# Patient Record
Sex: FEMALE | Race: WHITE | NOT HISPANIC OR LATINO | URBAN - METROPOLITAN AREA
[De-identification: names, ages, dates, MRNs, and addresses within clinical notes are randomized per-mention and may not be internally consistent; named-entity substitution may affect disease eponyms.]

---

## 2017-09-01 ENCOUNTER — OUTPATIENT (OUTPATIENT)
Dept: OUTPATIENT SERVICES | Facility: HOSPITAL | Age: 47
LOS: 1 days | End: 2017-09-01
Payer: MEDICAID

## 2017-09-05 DIAGNOSIS — R69 ILLNESS, UNSPECIFIED: ICD-10-CM

## 2017-10-01 PROCEDURE — G9001: CPT

## 2017-10-08 ENCOUNTER — EMERGENCY (EMERGENCY)
Facility: HOSPITAL | Age: 47
LOS: 1 days | Discharge: HOME | End: 2017-10-08
Attending: EMERGENCY MEDICINE | Admitting: EMERGENCY MEDICINE
Payer: MEDICAID

## 2017-10-08 VITALS
RESPIRATION RATE: 18 BRPM | HEART RATE: 103 BPM | SYSTOLIC BLOOD PRESSURE: 128 MMHG | OXYGEN SATURATION: 99 % | DIASTOLIC BLOOD PRESSURE: 65 MMHG

## 2017-10-08 VITALS
TEMPERATURE: 99 F | DIASTOLIC BLOOD PRESSURE: 81 MMHG | RESPIRATION RATE: 14 BRPM | OXYGEN SATURATION: 100 % | SYSTOLIC BLOOD PRESSURE: 122 MMHG | HEART RATE: 107 BPM

## 2017-10-08 LAB
ALBUMIN SERPL ELPH-MCNC: 3.5 G/DL — SIGNIFICANT CHANGE UP (ref 3.3–5)
ALP SERPL-CCNC: 66 U/L — SIGNIFICANT CHANGE UP (ref 40–120)
ALT FLD-CCNC: 14 U/L — SIGNIFICANT CHANGE UP (ref 4–33)
APAP SERPL-MCNC: < 15 UG/ML — LOW (ref 15–25)
AST SERPL-CCNC: 33 U/L — HIGH (ref 4–32)
BARBITURATES MEASUREMENT: NEGATIVE — SIGNIFICANT CHANGE UP
BASOPHILS # BLD AUTO: 0.07 K/UL — SIGNIFICANT CHANGE UP (ref 0–0.2)
BASOPHILS NFR BLD AUTO: 0.9 % — SIGNIFICANT CHANGE UP (ref 0–2)
BENZODIAZ SERPL-MCNC: NEGATIVE — SIGNIFICANT CHANGE UP
BILIRUB SERPL-MCNC: 0.4 MG/DL — SIGNIFICANT CHANGE UP (ref 0.2–1.2)
BUN SERPL-MCNC: 11 MG/DL — SIGNIFICANT CHANGE UP (ref 7–23)
CALCIUM SERPL-MCNC: 8.6 MG/DL — SIGNIFICANT CHANGE UP (ref 8.4–10.5)
CHLORIDE SERPL-SCNC: 103 MMOL/L — SIGNIFICANT CHANGE UP (ref 98–107)
CO2 SERPL-SCNC: 20 MMOL/L — LOW (ref 22–31)
CREAT SERPL-MCNC: 0.85 MG/DL — SIGNIFICANT CHANGE UP (ref 0.5–1.3)
EOSINOPHIL # BLD AUTO: 0.08 K/UL — SIGNIFICANT CHANGE UP (ref 0–0.5)
EOSINOPHIL NFR BLD AUTO: 1.1 % — SIGNIFICANT CHANGE UP (ref 0–6)
ETHANOL BLD-MCNC: < 10 MG/DL — SIGNIFICANT CHANGE UP
GLUCOSE SERPL-MCNC: 131 MG/DL — HIGH (ref 70–99)
HCT VFR BLD CALC: 30.9 % — LOW (ref 34.5–45)
HGB BLD-MCNC: 8.7 G/DL — LOW (ref 11.5–15.5)
IMM GRANULOCYTES # BLD AUTO: 0.05 # — SIGNIFICANT CHANGE UP
IMM GRANULOCYTES NFR BLD AUTO: 0.7 % — SIGNIFICANT CHANGE UP (ref 0–1.5)
LYMPHOCYTES # BLD AUTO: 1.75 K/UL — SIGNIFICANT CHANGE UP (ref 1–3.3)
LYMPHOCYTES # BLD AUTO: 23.5 % — SIGNIFICANT CHANGE UP (ref 13–44)
MCHC RBC-ENTMCNC: 19.1 PG — LOW (ref 27–34)
MCHC RBC-ENTMCNC: 28.2 % — LOW (ref 32–36)
MCV RBC AUTO: 67.9 FL — LOW (ref 80–100)
MONOCYTES # BLD AUTO: 0.65 K/UL — SIGNIFICANT CHANGE UP (ref 0–0.9)
MONOCYTES NFR BLD AUTO: 8.7 % — SIGNIFICANT CHANGE UP (ref 2–14)
NEUTROPHILS # BLD AUTO: 4.86 K/UL — SIGNIFICANT CHANGE UP (ref 1.8–7.4)
NEUTROPHILS NFR BLD AUTO: 65.1 % — SIGNIFICANT CHANGE UP (ref 43–77)
NRBC # FLD: 0 — SIGNIFICANT CHANGE UP
PLATELET # BLD AUTO: 325 K/UL — SIGNIFICANT CHANGE UP (ref 150–400)
PMV BLD: 9.9 FL — SIGNIFICANT CHANGE UP (ref 7–13)
POTASSIUM SERPL-MCNC: 4 MMOL/L — SIGNIFICANT CHANGE UP (ref 3.5–5.3)
POTASSIUM SERPL-SCNC: 4 MMOL/L — SIGNIFICANT CHANGE UP (ref 3.5–5.3)
PROT SERPL-MCNC: 6.8 G/DL — SIGNIFICANT CHANGE UP (ref 6–8.3)
RBC # BLD: 4.55 M/UL — SIGNIFICANT CHANGE UP (ref 3.8–5.2)
RBC # FLD: 19.7 % — HIGH (ref 10.3–14.5)
SALICYLATES SERPL-MCNC: < 5 MG/DL — LOW (ref 15–30)
SODIUM SERPL-SCNC: 137 MMOL/L — SIGNIFICANT CHANGE UP (ref 135–145)
WBC # BLD: 7.46 K/UL — SIGNIFICANT CHANGE UP (ref 3.8–10.5)
WBC # FLD AUTO: 7.46 K/UL — SIGNIFICANT CHANGE UP (ref 3.8–10.5)

## 2017-10-08 PROCEDURE — 93010 ELECTROCARDIOGRAM REPORT: CPT

## 2017-10-08 PROCEDURE — 71010: CPT | Mod: 26

## 2017-10-08 PROCEDURE — 99285 EMERGENCY DEPT VISIT HI MDM: CPT | Mod: 25

## 2017-10-08 RX ORDER — NALOXONE HYDROCHLORIDE 4 MG/.1ML
0.04 SPRAY NASAL ONCE
Qty: 0 | Refills: 0 | Status: COMPLETED | OUTPATIENT
Start: 2017-10-08 | End: 2017-10-08

## 2017-10-08 RX ORDER — ONDANSETRON 8 MG/1
4 TABLET, FILM COATED ORAL ONCE
Qty: 0 | Refills: 0 | Status: DISCONTINUED | OUTPATIENT
Start: 2017-10-08 | End: 2017-10-12

## 2017-10-08 RX ORDER — SODIUM CHLORIDE 9 MG/ML
1000 INJECTION INTRAMUSCULAR; INTRAVENOUS; SUBCUTANEOUS ONCE
Qty: 0 | Refills: 0 | Status: COMPLETED | OUTPATIENT
Start: 2017-10-08 | End: 2017-10-08

## 2017-10-08 RX ADMIN — NALOXONE HYDROCHLORIDE 0.04 MILLIGRAM(S): 4 SPRAY NASAL at 14:20

## 2017-10-08 RX ADMIN — SODIUM CHLORIDE 2000 MILLILITER(S): 9 INJECTION INTRAMUSCULAR; INTRAVENOUS; SUBCUTANEOUS at 14:24

## 2017-10-08 NOTE — ED ADULT NURSE NOTE - OBJECTIVE STATEMENT
p/t is a 47 y old female received awake and responsive @ present, p/t found lethargic @ home s/p some heroin use, cardiac monitor is on vss no further c/o noted will continue to monitor

## 2017-10-08 NOTE — ED PROVIDER NOTE - ATTENDING CONTRIBUTION TO CARE
I, Jennifer Cabot, MD, have performed a history and physical exam of the patient and discussed their management with the resident. I reviewed the resident's note and agree with the documented findings and plan of care. My medical decision making and observations are found above.    Cabot: 47F with PMH of intravenous heroin abuse, who was BIBEMS after ingesting 10 bags of heroin over 24 hours.  Her friends called EMS this morning when she was unresponsive.  Received 2mg narcan with good effect.  Denies other ingestions.  No F/C/N/V/D/urinary sx/CP/SOB.  Noted to be anemic in the ED.  Denies blood per rectum and hematuria and refuses guaiac. Denies SI/HI. On exam, HDS, AAOx3, no signs of trauma but + track marks, CTAB, heart sounds normal, abd benign, LEs without edema.  Tox screen negative, EKG unconcerning for ischemic or toxidrome.  Safe to go home.  Will emphasize follow up with a PMD re: anemia.

## 2017-10-08 NOTE — ED PROVIDER NOTE - MEDICAL DECISION MAKING DETAILS
47F s/p heroin overdose. 47F s/p heroin overdose.    Cabot: 47F with PMH of intravenous heroin abuse, who was BIBEMS after ingesting 10 bags of heroin over 24 hours.  Her friends called EMS this morning when she was unresponsive.  Received 2mg narcan with good effect.  Denies other ingestions.  No F/C/N/V/D/urinary sx/CP/SOB.  Noted to be anemic in the ED.  Denies blood per rectum and hematuria and refuses guaiac. Denies SI/HI. On exam, HDS, AAOx3, no signs of trauma but + track marks, CTAB, heart sounds normal, abd benign, LEs without edema.  Tox screen negative, EKG unconcerning for ischemic or toxidrome.  Safe to go home.  Will emphasize follow up with a PMD re: anemia. 47F s/p heroin overdose with narcan; will assess basic labs, observe, give additional narcan and reassess.  No evidence of trauma. Once ambulatory with steady gait and back to baseline, can dc home.     Cabot: 47F with PMH of intravenous heroin abuse, who was BIBEMS after ingesting 10 bags of heroin over 24 hours.  Her friends called EMS this morning when she was unresponsive.  Received 2mg narcan with good effect.  Denies other ingestions.  No F/C/N/V/D/urinary sx/CP/SOB.  Noted to be anemic in the ED.  Denies blood per rectum and hematuria and refuses guaiac. Denies SI/HI. On exam, HDS, AAOx3, no signs of trauma but + track marks, CTAB, heart sounds normal, abd benign, LEs without edema.  Tox screen negative, EKG unconcerning for ischemic or toxidrome.  Safe to go home.  Will emphasize follow up with a PMD re: anemia.

## 2017-10-08 NOTE — ED PROVIDER NOTE - PROGRESS NOTE DETAILS
Pt alert and oriented to person, place, time, situation, ambulatory, tolerating PO, no complaints. Wants to go home. Discussed anemia with patient who states has hx of anemia, denies dark stools and is refusing rectal exam for occult. Will dc home. Patient AAox3 without further narcan in the ED; ambulates, eats, denies SI/HI again.  Also reports history of anemia.

## 2017-10-08 NOTE — ED ADULT TRIAGE NOTE - CHIEF COMPLAINT QUOTE
pt arrives lethargic oriented x 3  responds appropriate to verbal stimuli given 2 mg nasal narcan as per EMS pt was more responsive after narcan

## 2017-10-08 NOTE — ED ADULT NURSE REASSESSMENT NOTE - NS ED NURSE REASSESS COMMENT FT1
pt awake and alert. able to ambulate without difficulty. ready for dc as per md. pt received at change of shift. has no iv access. awake and alert. able to ambulate without difficulty. ready for dc as per md.

## 2017-10-08 NOTE — ED PROVIDER NOTE - OBJECTIVE STATEMENT
47F PMH intravenous heroin abuse, formerly used crack cocaine but no longer, presenting after heroin overdose. pt states her   of heroin overdose 5 months ago. Pt had not used any heroin in 3.5 years but in the past 24 hours relapsed, using approx 12 bags IV over 24 hours, including several bags that she states "looked funny like crushed up pills" but used them anyway. Currently denies any complaints or inciting factors that caused her to relapse. Pt given 2 of narcan by EMS with improvement in mental status; was unresponsive upon their arrival, currently alert and oriented. States she feels like she is in withdrawal. 47F PMH intravenous heroin abuse, formerly used crack cocaine but no longer, presenting after heroin overdose. pt states her   of heroin overdose 5 months ago. Pt had not used any heroin in 3.5 years but in the past 24 hours relapsed, using approx 12 bags IV over 24 hours, including several bags that she states "looked funny like crushed up pills" but used them anyway. Currently denies any complaints or inciting factors that caused her to relapse. Pt given 2 of narcan by EMS with improvement in mental status; was unresponsive upon their arrival, currently alert and oriented. States she feels like she is in withdrawal. Pt denies any suicidal thoughts, denies overdose was a suicide attempt.

## 2017-10-26 ENCOUNTER — OUTPATIENT (OUTPATIENT)
Dept: OUTPATIENT SERVICES | Facility: HOSPITAL | Age: 47
LOS: 1 days | Discharge: HOME | End: 2017-10-26

## 2017-10-26 DIAGNOSIS — F11.99 OPIOID USE, UNSPECIFIED WITH UNSPECIFIED OPIOID-INDUCED DISORDER: ICD-10-CM

## 2017-10-30 DIAGNOSIS — F11.20 OPIOID DEPENDENCE, UNCOMPLICATED: ICD-10-CM

## 2017-10-30 LAB
ALBUMIN SERPL ELPH-MCNC: 3.9 G/DL — SIGNIFICANT CHANGE UP (ref 3.3–5)
ALP SERPL-CCNC: 75 U/L — SIGNIFICANT CHANGE UP (ref 40–120)
ALT FLD-CCNC: 8 U/L — SIGNIFICANT CHANGE UP (ref 4–33)
APPEARANCE UR: SIGNIFICANT CHANGE UP
AST SERPL-CCNC: 22 U/L — SIGNIFICANT CHANGE UP (ref 4–32)
BILIRUB SERPL-MCNC: 0.4 MG/DL — SIGNIFICANT CHANGE UP (ref 0.2–1.2)
BILIRUB UR-MCNC: NEGATIVE — SIGNIFICANT CHANGE UP
BLOOD UR QL VISUAL: NEGATIVE — SIGNIFICANT CHANGE UP
BUN SERPL-MCNC: 7 MG/DL — SIGNIFICANT CHANGE UP (ref 7–23)
CALCIUM SERPL-MCNC: 8.7 MG/DL — SIGNIFICANT CHANGE UP (ref 8.4–10.5)
CHLORIDE SERPL-SCNC: 99 MMOL/L — SIGNIFICANT CHANGE UP (ref 98–107)
CHOLEST SERPL-MCNC: 139 MG/DL — SIGNIFICANT CHANGE UP (ref 120–199)
CO2 SERPL-SCNC: 22 MMOL/L — SIGNIFICANT CHANGE UP (ref 22–31)
COLOR SPEC: YELLOW — SIGNIFICANT CHANGE UP
CREAT SERPL-MCNC: 0.64 MG/DL — SIGNIFICANT CHANGE UP (ref 0.5–1.3)
GLUCOSE SERPL-MCNC: 93 MG/DL — SIGNIFICANT CHANGE UP (ref 70–99)
GLUCOSE UR-MCNC: NEGATIVE — SIGNIFICANT CHANGE UP
HAV IGG+IGM SER QL: REACTIVE — SIGNIFICANT CHANGE UP
HBV SURFACE AG SER-ACNC: NEGATIVE — SIGNIFICANT CHANGE UP
HCT VFR BLD CALC: 31.5 % — LOW (ref 34.5–45)
HDLC SERPL-MCNC: 59 MG/DL — SIGNIFICANT CHANGE UP (ref 45–65)
HGB BLD-MCNC: 8.8 G/DL — LOW (ref 11.5–15.5)
HIV 1+2 AB+HIV1 P24 AG SERPL QL IA: SIGNIFICANT CHANGE UP
KETONES UR-MCNC: NEGATIVE — SIGNIFICANT CHANGE UP
LEUKOCYTE ESTERASE UR-ACNC: SIGNIFICANT CHANGE UP
LIPID PNL WITH DIRECT LDL SERPL: 66 MG/DL — SIGNIFICANT CHANGE UP
MCHC RBC-ENTMCNC: 18.1 PG — LOW (ref 27–34)
MCHC RBC-ENTMCNC: 27.9 % — LOW (ref 32–36)
MCV RBC AUTO: 64.8 FL — LOW (ref 80–100)
NITRITE UR-MCNC: NEGATIVE — SIGNIFICANT CHANGE UP
NRBC # FLD: 0 — SIGNIFICANT CHANGE UP
PH UR: 7.5 — SIGNIFICANT CHANGE UP (ref 4.6–8)
PLATELET # BLD AUTO: 182 K/UL — SIGNIFICANT CHANGE UP (ref 150–400)
PMV BLD: 10.5 FL — SIGNIFICANT CHANGE UP (ref 7–13)
POTASSIUM SERPL-MCNC: 4.5 MMOL/L — SIGNIFICANT CHANGE UP (ref 3.5–5.3)
POTASSIUM SERPL-SCNC: 4.5 MMOL/L — SIGNIFICANT CHANGE UP (ref 3.5–5.3)
PROT SERPL-MCNC: 6.7 G/DL — SIGNIFICANT CHANGE UP (ref 6–8.3)
PROT UR-MCNC: 10 — SIGNIFICANT CHANGE UP
RBC # BLD: 4.86 M/UL — SIGNIFICANT CHANGE UP (ref 3.8–5.2)
RBC # FLD: 19.2 % — HIGH (ref 10.3–14.5)
SODIUM SERPL-SCNC: 137 MMOL/L — SIGNIFICANT CHANGE UP (ref 135–145)
SP GR SPEC: 1.01 — SIGNIFICANT CHANGE UP (ref 1–1.03)
SQUAMOUS # UR AUTO: SIGNIFICANT CHANGE UP
T PALLIDUM AB TITR SER: NEGATIVE — SIGNIFICANT CHANGE UP
TRIGL SERPL-MCNC: 81 MG/DL — SIGNIFICANT CHANGE UP (ref 10–149)
UROBILINOGEN FLD QL: 4 E.U. — HIGH (ref 0.1–0.2)
WBC # BLD: 6.78 K/UL — SIGNIFICANT CHANGE UP (ref 3.8–10.5)
WBC # FLD AUTO: 6.78 K/UL — SIGNIFICANT CHANGE UP (ref 3.8–10.5)

## 2017-10-31 LAB
HCV RNA SERPL NAA DL=5-ACNC: NOT DETECTED — SIGNIFICANT CHANGE UP
HCV RNA SPEC NAA+PROBE-LOG IU: SIGNIFICANT CHANGE UP LOGIU/ML

## 2017-11-01 LAB — HBV SURFACE AB SER-ACNC: REACTIVE — SIGNIFICANT CHANGE UP

## 2018-01-19 LAB
HCG UR-SCNC: NEGATIVE — SIGNIFICANT CHANGE UP
SP GR UR: 1.02 — SIGNIFICANT CHANGE UP (ref 1–1.03)

## 2019-03-01 ENCOUNTER — OUTPATIENT (OUTPATIENT)
Dept: OUTPATIENT SERVICES | Facility: HOSPITAL | Age: 49
LOS: 1 days | End: 2019-03-01
Payer: MEDICAID

## 2019-03-01 PROCEDURE — G9001: CPT

## 2019-03-15 DIAGNOSIS — Z71.89 OTHER SPECIFIED COUNSELING: ICD-10-CM

## 2021-12-21 ENCOUNTER — TRANSCRIPTION ENCOUNTER (OUTPATIENT)
Age: 51
End: 2021-12-21

## 2022-06-13 NOTE — ED ADULT NURSE NOTE - CHIEF COMPLAINT QUOTE
pt arrives lethargic oriented x 3  responds appropriate to verbal stimuli given 2 mg nasal narcan as per EMS pt was more responsive after narcan Simple / Intermediate / Complex Repair - Final Wound Length In Cm: 0

## 2024-03-05 ENCOUNTER — APPOINTMENT (OUTPATIENT)
Dept: NEUROSURGERY | Facility: CLINIC | Age: 54
End: 2024-03-05
Payer: MEDICAID

## 2024-03-05 VITALS
OXYGEN SATURATION: 95 % | TEMPERATURE: 99.7 F | HEIGHT: 65 IN | HEART RATE: 94 BPM | SYSTOLIC BLOOD PRESSURE: 146 MMHG | DIASTOLIC BLOOD PRESSURE: 95 MMHG

## 2024-03-05 DIAGNOSIS — Z90.49 ACQUIRED ABSENCE OF OTHER SPECIFIED PARTS OF DIGESTIVE TRACT: ICD-10-CM

## 2024-03-05 DIAGNOSIS — Z98.890 OTHER SPECIFIED POSTPROCEDURAL STATES: ICD-10-CM

## 2024-03-05 DIAGNOSIS — Z98.84 BARIATRIC SURGERY STATUS: ICD-10-CM

## 2024-03-05 DIAGNOSIS — Z82.49 FAMILY HISTORY OF ISCHEMIC HEART DISEASE AND OTHER DISEASES OF THE CIRCULATORY SYSTEM: ICD-10-CM

## 2024-03-05 DIAGNOSIS — F17.200 NICOTINE DEPENDENCE, UNSPECIFIED, UNCOMPLICATED: ICD-10-CM

## 2024-03-05 DIAGNOSIS — K25.9 GASTRIC ULCER, UNSPECIFIED AS ACUTE OR CHRONIC, W/OUT HEMORRHAGE OR PERFORATION: ICD-10-CM

## 2024-03-05 DIAGNOSIS — Z87.19 OTHER SPECIFIED POSTPROCEDURAL STATES: ICD-10-CM

## 2024-03-05 PROCEDURE — 99203 OFFICE O/P NEW LOW 30 MIN: CPT

## 2024-03-05 RX ORDER — GABAPENTIN 800 MG/1
800 TABLET, COATED ORAL
Refills: 0 | Status: ACTIVE | COMMUNITY

## 2024-03-05 NOTE — HISTORY OF PRESENT ILLNESS
[FreeTextEntry1] : right l5 radicular leg pain [de-identified] : Ms. Enma Can is a very pleasant 53 year old female with a history of gastric bypass, cholecystectomy, gastric ulcers, GERD, hernia repair, active smoker on suboxone who presents with 6-7 months of right leg pain.  She denies an inciting event but does note that she fell down the stairs a few months prior.  Her right leg pain involves her right buttock and then down the side of her leg to the outside of her ankle into the top of her foot.  The pain previously was a stabbing pain but she underwent injections with Dr. Hale and now it is just a throbbing pain.  The pain remains constant though.  She is on gabapentin and Suboxone.  Placing frozen ice bottles against her skin makes the pain a little bit better.  Sitting for too long and any movement makes the pain worse.  She is also taking Motrin.  She denies any numbness tingling.  She has not had any recent physical therapy.  Pain management injections: 2/2024: right SI joint 12/2023: right L5-S1 TFESI 11/2023: right L5-S1 LESI 11/2023: right SI joint  PMH/PSH: as above Active smoker Lives at home with  who was recently diagnosed with stage 4 cancer, stepdaughter, nephew Works for landscaping company

## 2024-03-05 NOTE — ASSESSMENT
[FreeTextEntry1] : Ms. Enma Can is a very pleasant 53 year old female with a history of gastric bypass, cholecystectomy, gastric ulcers, GERD, hernia repair, active smoker on suboxone who presents with 6-7 months of right leg pain in an L5 distribution that is referrable to the severe right foraminal stenosis seen at L5-S1.  I reviewed her imaging with her and discussed her treatment options for continued conservative management vs. surgery in the form of a right sided L5-S1 decompression.  Given her persistent pain despite several months of conservative therapy and injections, she would like to pursue surgery.  I discussed with her the details of surgery as well as the anticipated recovery.  She will need to obtain clearance from her primary care doctor.  My office will work on scheduling.  I emphasized the importance of smoking cessation and have also provided a physical therapy prescription for her to work on strengthening while I schedule surgery.  Given her history of gastric ulcers and gastric bypass, I also advised her to minimize her NSAID use and to primarily use Tylenol instead.  All questions answered.  She knows to call my office with any issues or concerns.

## 2024-03-12 ENCOUNTER — OUTPATIENT (OUTPATIENT)
Dept: OUTPATIENT SERVICES | Facility: HOSPITAL | Age: 54
LOS: 1 days | End: 2024-03-12
Payer: COMMERCIAL

## 2024-03-12 ENCOUNTER — APPOINTMENT (OUTPATIENT)
Dept: NEUROSURGERY | Facility: CLINIC | Age: 54
End: 2024-03-12
Payer: MEDICAID

## 2024-03-12 VITALS
OXYGEN SATURATION: 98 % | TEMPERATURE: 97 F | RESPIRATION RATE: 20 BRPM | HEIGHT: 65 IN | SYSTOLIC BLOOD PRESSURE: 130 MMHG | WEIGHT: 134.04 LBS | HEART RATE: 90 BPM | DIASTOLIC BLOOD PRESSURE: 80 MMHG

## 2024-03-12 VITALS
DIASTOLIC BLOOD PRESSURE: 68 MMHG | SYSTOLIC BLOOD PRESSURE: 128 MMHG | BODY MASS INDEX: 20.66 KG/M2 | TEMPERATURE: 100.2 F | HEIGHT: 65 IN | HEART RATE: 95 BPM | OXYGEN SATURATION: 95 % | WEIGHT: 124 LBS

## 2024-03-12 DIAGNOSIS — Z98.890 OTHER SPECIFIED POSTPROCEDURAL STATES: Chronic | ICD-10-CM

## 2024-03-12 DIAGNOSIS — Z90.49 ACQUIRED ABSENCE OF OTHER SPECIFIED PARTS OF DIGESTIVE TRACT: Chronic | ICD-10-CM

## 2024-03-12 DIAGNOSIS — M99.63 OSSEOUS AND SUBLUXATION STENOSIS OF INTERVERTEBRAL FORAMINA OF LUMBAR REGION: ICD-10-CM

## 2024-03-12 DIAGNOSIS — F11.20 OPIOID DEPENDENCE, UNCOMPLICATED: ICD-10-CM

## 2024-03-12 DIAGNOSIS — Z01.818 ENCOUNTER FOR OTHER PREPROCEDURAL EXAMINATION: ICD-10-CM

## 2024-03-12 DIAGNOSIS — Z29.9 ENCOUNTER FOR PROPHYLACTIC MEASURES, UNSPECIFIED: ICD-10-CM

## 2024-03-12 DIAGNOSIS — F17.200 NICOTINE DEPENDENCE, UNSPECIFIED, UNCOMPLICATED: ICD-10-CM

## 2024-03-12 DIAGNOSIS — M54.16 RADICULOPATHY, LUMBAR REGION: ICD-10-CM

## 2024-03-12 DIAGNOSIS — Z98.84 BARIATRIC SURGERY STATUS: Chronic | ICD-10-CM

## 2024-03-12 DIAGNOSIS — F10.20 ALCOHOL DEPENDENCE, UNCOMPLICATED: ICD-10-CM

## 2024-03-12 LAB
A1C WITH ESTIMATED AVERAGE GLUCOSE RESULT: 5.5 % — SIGNIFICANT CHANGE UP (ref 4–5.6)
ANION GAP SERPL CALC-SCNC: 8 MMOL/L — SIGNIFICANT CHANGE UP (ref 5–17)
APTT BLD: 26.9 SEC — SIGNIFICANT CHANGE UP (ref 24.5–35.6)
BASOPHILS # BLD AUTO: 0.11 K/UL — SIGNIFICANT CHANGE UP (ref 0–0.2)
BASOPHILS NFR BLD AUTO: 1.2 % — SIGNIFICANT CHANGE UP (ref 0–2)
BLD GP AB SCN SERPL QL: SIGNIFICANT CHANGE UP
BUN SERPL-MCNC: 6.9 MG/DL — LOW (ref 8–20)
CALCIUM SERPL-MCNC: 9.2 MG/DL — SIGNIFICANT CHANGE UP (ref 8.4–10.5)
CHLORIDE SERPL-SCNC: 104 MMOL/L — SIGNIFICANT CHANGE UP (ref 96–108)
CO2 SERPL-SCNC: 27 MMOL/L — SIGNIFICANT CHANGE UP (ref 22–29)
CREAT SERPL-MCNC: 0.62 MG/DL — SIGNIFICANT CHANGE UP (ref 0.5–1.3)
EGFR: 108 ML/MIN/1.73M2 — SIGNIFICANT CHANGE UP
EOSINOPHIL # BLD AUTO: 0.25 K/UL — SIGNIFICANT CHANGE UP (ref 0–0.5)
EOSINOPHIL NFR BLD AUTO: 2.8 % — SIGNIFICANT CHANGE UP (ref 0–6)
ESTIMATED AVERAGE GLUCOSE: 111 MG/DL — SIGNIFICANT CHANGE UP (ref 68–114)
GLUCOSE SERPL-MCNC: 92 MG/DL — SIGNIFICANT CHANGE UP (ref 70–99)
HCT VFR BLD CALC: 42.2 % — SIGNIFICANT CHANGE UP (ref 34.5–45)
HGB BLD-MCNC: 13.7 G/DL — SIGNIFICANT CHANGE UP (ref 11.5–15.5)
IMM GRANULOCYTES NFR BLD AUTO: 0.6 % — SIGNIFICANT CHANGE UP (ref 0–0.9)
INR BLD: 0.79 RATIO — LOW (ref 0.85–1.18)
LYMPHOCYTES # BLD AUTO: 3.74 K/UL — HIGH (ref 1–3.3)
LYMPHOCYTES # BLD AUTO: 41.3 % — SIGNIFICANT CHANGE UP (ref 13–44)
MCHC RBC-ENTMCNC: 28.7 PG — SIGNIFICANT CHANGE UP (ref 27–34)
MCHC RBC-ENTMCNC: 32.5 GM/DL — SIGNIFICANT CHANGE UP (ref 32–36)
MCV RBC AUTO: 88.5 FL — SIGNIFICANT CHANGE UP (ref 80–100)
MONOCYTES # BLD AUTO: 0.88 K/UL — SIGNIFICANT CHANGE UP (ref 0–0.9)
MONOCYTES NFR BLD AUTO: 9.7 % — SIGNIFICANT CHANGE UP (ref 2–14)
MRSA PCR RESULT.: SIGNIFICANT CHANGE UP
NEUTROPHILS # BLD AUTO: 4.02 K/UL — SIGNIFICANT CHANGE UP (ref 1.8–7.4)
NEUTROPHILS NFR BLD AUTO: 44.4 % — SIGNIFICANT CHANGE UP (ref 43–77)
PLATELET # BLD AUTO: 243 K/UL — SIGNIFICANT CHANGE UP (ref 150–400)
POTASSIUM SERPL-MCNC: 4.7 MMOL/L — SIGNIFICANT CHANGE UP (ref 3.5–5.3)
POTASSIUM SERPL-SCNC: 4.7 MMOL/L — SIGNIFICANT CHANGE UP (ref 3.5–5.3)
PROTHROM AB SERPL-ACNC: 8.8 SEC — LOW (ref 9.5–13)
RBC # BLD: 4.77 M/UL — SIGNIFICANT CHANGE UP (ref 3.8–5.2)
RBC # FLD: 13.5 % — SIGNIFICANT CHANGE UP (ref 10.3–14.5)
S AUREUS DNA NOSE QL NAA+PROBE: SIGNIFICANT CHANGE UP
SODIUM SERPL-SCNC: 139 MMOL/L — SIGNIFICANT CHANGE UP (ref 135–145)
WBC # BLD: 9.05 K/UL — SIGNIFICANT CHANGE UP (ref 3.8–10.5)
WBC # FLD AUTO: 9.05 K/UL — SIGNIFICANT CHANGE UP (ref 3.8–10.5)

## 2024-03-12 PROCEDURE — 71046 X-RAY EXAM CHEST 2 VIEWS: CPT

## 2024-03-12 PROCEDURE — G0463: CPT

## 2024-03-12 PROCEDURE — 71046 X-RAY EXAM CHEST 2 VIEWS: CPT | Mod: 26

## 2024-03-12 PROCEDURE — 93010 ELECTROCARDIOGRAM REPORT: CPT

## 2024-03-12 PROCEDURE — 99214 OFFICE O/P EST MOD 30 MIN: CPT

## 2024-03-12 PROCEDURE — 93005 ELECTROCARDIOGRAM TRACING: CPT

## 2024-03-12 NOTE — H&P PST ADULT - NSICDXPASTMEDICALHX_GEN_ALL_CORE_FT
PAST MEDICAL HISTORY:  Fibroids     History of stomach ulcers      PAST MEDICAL HISTORY:  ETOHism     Fibroids     GERD (gastroesophageal reflux disease)     History of stomach ulcers     Right lumbar radiculopathy

## 2024-03-12 NOTE — H&P PST ADULT - PROBLEM SELECTOR PLAN 6
Patient advised to stop or reduce smoking prior to surgery. Patient advised to discuss cessation with PCP today. CXR pending. Medical clearance pending.

## 2024-03-12 NOTE — H&P PST ADULT - MUSCULOSKELETAL COMMENTS
pain from right gluteal down right leg, occasional numbness/tingling to right knee pain with bending, walking, antalgic gait

## 2024-03-12 NOTE — H&P PST ADULT - NEGATIVE GASTROINTESTINAL SYMPTOMS
no nausea/no vomiting/no diarrhea/no constipation/no change in bowel habits/no flatulence/no abdominal pain/no melena/no hematochezia/no steatorrhea/no jaundice

## 2024-03-12 NOTE — H&P PST ADULT - PROBLEM SELECTOR PLAN 3
Caprini Score 7. High risk,  Surgical team should assess /Strongly recommend pharmacological and mechanical measures for VTE prophylaxis indicated

## 2024-03-12 NOTE — H&P PST ADULT - NSICDXPASTSURGICALHX_GEN_ALL_CORE_FT
PAST SURGICAL HISTORY:  H/O gastric bypass     S/P cholecystectomy     S/P trigger finger release      PAST SURGICAL HISTORY:  H/O carpal tunnel repair     H/O gastric bypass     H/O hernia repair     S/P appendectomy     S/P cholecystectomy     S/P trigger finger release

## 2024-03-12 NOTE — H&P PST ADULT - GASTROINTESTINAL
details… normal/soft/nontender/nondistended/normal active bowel sounds/no guarding/no rigidity/no organomegaly/no palpable madhu/no masses palpable

## 2024-03-12 NOTE — H&P PST ADULT - RX
was diagnosed with sleep apnea prior to gastric bypass but hasn't had issues since using weight. Used CPAP in past.

## 2024-03-12 NOTE — H&P PST ADULT - PROBLEM SELECTOR PLAN 5
Patient has hx of ETOH abuse. Last drink 1/2023. Pt using AA, encouraged to reach out for support when needed. Pt on Suboxone patient to continue 8mg dose as usual including DOS. Medical clearance pending.

## 2024-03-12 NOTE — H&P PST ADULT - ASSESSMENT
Patient educated on surgical scrub, preadmission instructions, medical clearance and day of procedure medications, pt. verbalizes understanding and agreement. Pt. to have medical clearance with  and pt. verbalized agreement and understanding.       CAPRINI SCORE    AGE RELATED RISK FACTORS                                                             [ ] Age 41-60 years                                            (1 Point)  [ ] Age: 61-74 years                                           (2 Points)                 [ ] Age= 75 years                                                (3 Points)             DISEASE RELATED RISK FACTORS                                                       [ ] Edema in the lower extremities                 (1 Point)                     [ ] Varicose veins                                               (1 Point)                                 [ ] BMI > 25 Kg/m2                                            (1 Point)                                  [ ] Serious infection (ie PNA)                            (1 Point)                     [ ] Lung disease ( COPD, Emphysema)            (1 Point)                                                                          [ ] Acute myocardial infarction                         (1 Point)                  [ ] Congestive heart failure (in the previous month)  (1 Point)         [ ] Inflammatory bowel disease                            (1 Point)                  [ ] Central venous access, PICC or Port               (2 points)       (within the last month)                                                                [ ] Stroke (in the previous month)                        (5 Points)    [ ] Previous or present malignancy                       (2 points)                                                                                                                                                         HEMATOLOGY RELATED FACTORS                                                         [ ] Prior episodes of VTE                                     (3 Points)                     [ ] Positive family history for VTE                      (3 Points)                  [ ] Prothrombin 96551 A                                     (3 Points)                     [ ] Factor V Leiden                                                (3 Points)                        [ ] Lupus anticoagulants                                      (3 Points)                                                           [ ] Anticardiolipin antibodies                              (3 Points)                                                       [ ] High homocysteine in the blood                   (3 Points)                                             [ ] Other congenital or acquired thrombophilia      (3 Points)                                                [ ] Heparin induced thrombocytopenia                  (3 Points)                                        MOBILITY RELATED FACTORS  [ ] Bed rest                                                         (1 Point)  [ ] Plaster cast                                                    (2 points)  [ ] Bed bound for more than 72 hours           (2 Points)    GENDER SPECIFIC FACTORS  [ ] Pregnancy or had a baby within the last month   (1 Point)  [ ] Post-partum < 6 weeks                                   (1 Point)  [ ] Hormonal therapy  or oral contraception   (1 Point)  [ ] History of pregnancy complications              (1 point)  [ ] Unexplained or recurrent              (1 Point)    OTHER RISK FACTORS                                           (1 Point)  [ ] BMI >40, smoking, diabetes requiring insulin, chemotherapy  blood transfusions and length of surgery over 2 hours    SURGERY RELATED RISK FACTORS  [ ]  Section within the last month     (1 Point)  [ ] Minor surgery                                                  (1 Point)  [ ] Arthroscopic surgery                                       (2 Points)  [ ] Planned major surgery lasting more            (2 Points)      than 45 minutes     [ ] Elective hip or knee joint replacement       (5 points)       surgery                                                TRAUMA RELATED RISK FACTORS  [ ] Fracture of the hip, pelvis, or leg                       (5 Points)  [ ] Spinal cord injury resulting in paralysis             (5 points)       (in the previous month)    [ ] Paralysis  (less than 1 month)                             (5 Points)  [ ] Multiple Trauma within 1 month                        (5 Points)    Total Score [        ]    Caprini Score 0-2: Low Risk, NO VTE prophylaxis required for most patients, encourage ambulation  Caprini Score 3-6: Moderate Risk , pharmacologic VTE prophylaxis is indicated for most patients (in the absence of contraindications)  Caprini Score Greater than or =7: High risk, pharmocologic VTE prophylaxis indicated for most patients (in the absence of contraindications)    OPIOID RISK TOOL    [place] EACH BOX THAT APPLIES AND ADD TOTALS AT THE END    FAMILY HISTORY OF SUBSTANCE ABUSE                 FEMALE         MALE                                                Alcohol                             [  ]1 pt          [  ]3pts                                               Illegal Durgs                     [  ]2 pts        [  ]3pts                                               Rx Drugs                           [  ]4 pts        [  ]4 pts    PERSONAL HISTORY OF SUBSTANCE ABUSE                                                                                          Alcohol                             [  ]3 pts       [  ]3 pts                                               Illegal Drugs                     [  ]4 pts        [  ]4 pts                                               Rx Drugs                           [  ]5 pts        [  ]5 pts    AGE BETWEEN 16-45 YEARS                                      [  ]1 pt         [  ]1 pt    HISTORY OF PREADOLESCENT   SEXUAL ABUSE                                                             [  ]3 pts        [  ]0pts    PSYCHOLOGICAL DISEASE                     ADD, OCD, Bipolar, Schizophrenia        [  ]2 pts         [  ]2 pts                      Depression                                               [  ]1 pt           [  ]1 pt           SCORING TOTAL   (add numbers and type here)              (***)                                     A score of 3 or lower indicated LOW risk for future opioid abuse  A score of 4 to 7 indicated moderate risk for future opioid abuse  A score of 8 or higher indicates a high risk for opioid abuse   Patient educated on surgical scrub, preadmission instructions, medical clearance and day of procedure medications, pt. verbalizes understanding and agreement. Pt. to have medical clearance with Dr. Augustin 3/12/2024 and pt. verbalized agreement and understanding.       CAPRINI SCORE    AGE RELATED RISK FACTORS                                                             [x ] Age 41-60 years                                            (1 Point)  [ ] Age: 61-74 years                                           (2 Points)                 [ ] Age= 75 years                                                (3 Points)             DISEASE RELATED RISK FACTORS                                                       [ ] Edema in the lower extremities                 (1 Point)                     [ ] Varicose veins                                               (1 Point)                                 [ ] BMI > 25 Kg/m2                                            (1 Point)                                  [ ] Serious infection (ie PNA)                            (1 Point)                     [ ] Lung disease ( COPD, Emphysema)            (1 Point)                                                                          [ ] Acute myocardial infarction                         (1 Point)                  [ ] Congestive heart failure (in the previous month)  (1 Point)         [ ] Inflammatory bowel disease                            (1 Point)                  [ ] Central venous access, PICC or Port               (2 points)       (within the last month)                                                                [ ] Stroke (in the previous month)                        (5 Points)    [ ] Previous or present malignancy                       (2 points)                                                                                                                                                         HEMATOLOGY RELATED FACTORS                                                         [ x] Prior episodes of VTE                                     (3 Points)                     [ ] Positive family history for VTE                      (3 Points)                  [ ] Prothrombin 19815 A                                     (3 Points)                     [ ] Factor V Leiden                                                (3 Points)                        [ ] Lupus anticoagulants                                      (3 Points)                                                           [ ] Anticardiolipin antibodies                              (3 Points)                                                       [ ] High homocysteine in the blood                   (3 Points)                                             [ ] Other congenital or acquired thrombophilia      (3 Points)                                                [ ] Heparin induced thrombocytopenia                  (3 Points)                                        MOBILITY RELATED FACTORS  [ ] Bed rest                                                         (1 Point)  [ ] Plaster cast                                                    (2 points)  [ ] Bed bound for more than 72 hours           (2 Points)    GENDER SPECIFIC FACTORS  [ ] Pregnancy or had a baby within the last month   (1 Point)  [ ] Post-partum < 6 weeks                                   (1 Point)  [ ] Hormonal therapy  or oral contraception   (1 Point)  [ ] History of pregnancy complications              (1 point)  [ ] Unexplained or recurrent              (1 Point)    OTHER RISK FACTORS                                           (1 Point)  [x ] BMI >40, smoking, diabetes requiring insulin, chemotherapy  blood transfusions and length of surgery over 2 hours    SURGERY RELATED RISK FACTORS  [ ]  Section within the last month     (1 Point)  [ ] Minor surgery                                                  (1 Point)  [ ] Arthroscopic surgery                                       (2 Points)  [x ] Planned major surgery lasting more            (2 Points)      than 45 minutes     [ ] Elective hip or knee joint replacement       (5 points)       surgery                                                TRAUMA RELATED RISK FACTORS  [ ] Fracture of the hip, pelvis, or leg                       (5 Points)  [ ] Spinal cord injury resulting in paralysis             (5 points)       (in the previous month)    [ ] Paralysis  (less than 1 month)                             (5 Points)  [ ] Multiple Trauma within 1 month                        (5 Points)    Total Score [  7      ]    Caprini Score 0-2: Low Risk, NO VTE prophylaxis required for most patients, encourage ambulation  Caprini Score 3-6: Moderate Risk , pharmacologic VTE prophylaxis is indicated for most patients (in the absence of contraindications)  Caprini Score Greater than or =7: High risk, pharmocologic VTE prophylaxis indicated for most patients (in the absence of contraindications)    OPIOID RISK TOOL    [place] EACH BOX THAT APPLIES AND ADD TOTALS AT THE END    FAMILY HISTORY OF SUBSTANCE ABUSE                 FEMALE         MALE                                                Alcohol                             [  ]1 pt          [  ]3pts                                               Illegal Durgs                     [  ]2 pts        [  ]3pts                                               Rx Drugs                           [  ]4 pts        [  ]4 pts    PERSONAL HISTORY OF SUBSTANCE ABUSE                                                                                          Alcohol                             [ x ]3 pts       [  ]3 pts                                               Illegal Drugs                     [ x ]4 pts        [  ]4 pts                                               Rx Drugs                           [  ]5 pts        [  ]5 pts    AGE BETWEEN 16-45 YEARS                                      [  ]1 pt         [  ]1 pt    HISTORY OF PREADOLESCENT   SEXUAL ABUSE                                                             [  ]3 pts        [  ]0pts    PSYCHOLOGICAL DISEASE                     ADD, OCD, Bipolar, Schizophrenia        [  ]2 pts         [  ]2 pts                      Depression                                               [  ]1 pt           [  ]1 pt           SCORING TOTAL   (add numbers and type here)              (*7*)                                     A score of 3 or lower indicated LOW risk for future opioid abuse  A score of 4 to 7 indicated moderate risk for future opioid abuse  A score of 8 or higher indicates a high risk for opioid abuse   54 yo female presents for PST with PMH of of gastric bypass, cholecystectomy, gastric ulcers, GERD, hernia repair, active smoker, ETOH dependance, in AA, last drink 2023, on Suboxone, right lumbar radiculopathy. Patient presents with 6-7 months of right leg pain in an L5 distribution that is referable to the severe right foraminal stenosis seen at L5-S1. Pain located in right gluteal with radiation down to right foot. Pain is described as sharp and shooting pain. Pain rated an 8/10 continuous throughout the day. Pain is worse with walking, stairs, standing. Patient has been taking Gabapentin, Suboxone Motrin with no relief. Pt scheduled for a Right L5-S1 Decompression scheduled 3/14/24 with Dr. Loyola. Patient educated on surgical scrub, preadmission instructions, medical clearance and day of procedure medications, pt. verbalizes understanding and agreement. Pt. to have medical clearance with Dr. Augustin 3/12/2024 and pt. verbalized agreement and understanding.       CAPRINI SCORE    AGE RELATED RISK FACTORS                                                             [x ] Age 41-60 years                                            (1 Point)  [ ] Age: 61-74 years                                           (2 Points)                 [ ] Age= 75 years                                                (3 Points)             DISEASE RELATED RISK FACTORS                                                       [ ] Edema in the lower extremities                 (1 Point)                     [ ] Varicose veins                                               (1 Point)                                 [ ] BMI > 25 Kg/m2                                            (1 Point)                                  [ ] Serious infection (ie PNA)                            (1 Point)                     [ ] Lung disease ( COPD, Emphysema)            (1 Point)                                                                          [ ] Acute myocardial infarction                         (1 Point)                  [ ] Congestive heart failure (in the previous month)  (1 Point)         [ ] Inflammatory bowel disease                            (1 Point)                  [ ] Central venous access, PICC or Port               (2 points)       (within the last month)                                                                [ ] Stroke (in the previous month)                        (5 Points)    [ ] Previous or present malignancy                       (2 points)                                                                                                                                                         HEMATOLOGY RELATED FACTORS                                                         [ x] Prior episodes of VTE                                     (3 Points)                     [ ] Positive family history for VTE                      (3 Points)                  [ ] Prothrombin 89780 A                                     (3 Points)                     [ ] Factor V Leiden                                                (3 Points)                        [ ] Lupus anticoagulants                                      (3 Points)                                                           [ ] Anticardiolipin antibodies                              (3 Points)                                                       [ ] High homocysteine in the blood                   (3 Points)                                             [ ] Other congenital or acquired thrombophilia      (3 Points)                                                [ ] Heparin induced thrombocytopenia                  (3 Points)                                        MOBILITY RELATED FACTORS  [ ] Bed rest                                                         (1 Point)  [ ] Plaster cast                                                    (2 points)  [ ] Bed bound for more than 72 hours           (2 Points)    GENDER SPECIFIC FACTORS  [ ] Pregnancy or had a baby within the last month   (1 Point)  [ ] Post-partum < 6 weeks                                   (1 Point)  [ ] Hormonal therapy  or oral contraception   (1 Point)  [ ] History of pregnancy complications              (1 point)  [ ] Unexplained or recurrent              (1 Point)    OTHER RISK FACTORS                                           (1 Point)  [x ] BMI >40, smoking, diabetes requiring insulin, chemotherapy  blood transfusions and length of surgery over 2 hours    SURGERY RELATED RISK FACTORS  [ ]  Section within the last month     (1 Point)  [ ] Minor surgery                                                  (1 Point)  [ ] Arthroscopic surgery                                       (2 Points)  [x ] Planned major surgery lasting more            (2 Points)      than 45 minutes     [ ] Elective hip or knee joint replacement       (5 points)       surgery                                                TRAUMA RELATED RISK FACTORS  [ ] Fracture of the hip, pelvis, or leg                       (5 Points)  [ ] Spinal cord injury resulting in paralysis             (5 points)       (in the previous month)    [ ] Paralysis  (less than 1 month)                             (5 Points)  [ ] Multiple Trauma within 1 month                        (5 Points)    Total Score [  7      ]    Caprini Score 0-2: Low Risk, NO VTE prophylaxis required for most patients, encourage ambulation  Caprini Score 3-6: Moderate Risk , pharmacologic VTE prophylaxis is indicated for most patients (in the absence of contraindications)  Caprini Score Greater than or =7: High risk, pharmocologic VTE prophylaxis indicated for most patients (in the absence of contraindications)    OPIOID RISK TOOL    [place] EACH BOX THAT APPLIES AND ADD TOTALS AT THE END    FAMILY HISTORY OF SUBSTANCE ABUSE                 FEMALE         MALE                                                Alcohol                             [  ]1 pt          [  ]3pts                                               Illegal Durgs                     [  ]2 pts        [  ]3pts                                               Rx Drugs                           [  ]4 pts        [  ]4 pts    PERSONAL HISTORY OF SUBSTANCE ABUSE                                                                                          Alcohol                             [ x ]3 pts       [  ]3 pts                                               Illegal Drugs                     [  ]4 pts        [  ]4 pts                                               Rx Drugs                           [  ]5 pts        [  ]5 pts    AGE BETWEEN 16-45 YEARS                                      [  ]1 pt         [  ]1 pt    HISTORY OF PREADOLESCENT   SEXUAL ABUSE                                                             [  ]3 pts        [  ]0pts    PSYCHOLOGICAL DISEASE                     ADD, OCD, Bipolar, Schizophrenia        [  ]2 pts         [  ]2 pts                      Depression                                               [  ]1 pt           [  ]1 pt           SCORING TOTAL   (add numbers and type here)              (*3*)                                     A score of 3 or lower indicated LOW risk for future opioid abuse  A score of 4 to 7 indicated moderate risk for future opioid abuse  A score of 8 or higher indicates a high risk for opioid abuse   52 yo female presents for PST with PMH of of gastric bypass, cholecystectomy, gastric ulcers, GERD, hernia repair, active smoker, ETOH dependance, in AA, last drink 2023, on Suboxone, right lumbar radiculopathy. Patient presents with 6-7 months of right leg pain in an L5 distribution that is referable to the severe right foraminal stenosis seen at L5-S1. Pain located in right gluteal with radiation down to right foot. Pain is described as sharp and shooting pain. Pain rated an 8/10 continuous throughout the day. Pain is worse with walking, stairs, standing. Patient has been taking Gabapentin, Suboxone Motrin with no relief. Placing frozen ice bottles against her skin makes the pain a little bit better. Sitting for too long and any movement makes the pain worse. She is also taking Motrin. She denies any numbness tingling. She has not had any recent physical therapy. Pt denies bowel/bladder function changes, dizziness, CP, SOB, fever/chills, nausea/vomiting. Pt scheduled for a Right L5-S1 Decompression scheduled 3/14/24 with Dr. Loyola. Patient educated on surgical scrub, preadmission instructions, medical clearance and day of procedure medications, pt. verbalizes understanding and agreement. Pt. to have medical clearance with Dr. Augustin 3/12/2024 and pt. verbalized agreement and understanding.       CAPRINI SCORE    AGE RELATED RISK FACTORS                                                             [x ] Age 41-60 years                                            (1 Point)  [ ] Age: 61-74 years                                           (2 Points)                 [ ] Age= 75 years                                                (3 Points)             DISEASE RELATED RISK FACTORS                                                       [ ] Edema in the lower extremities                 (1 Point)                     [ ] Varicose veins                                               (1 Point)                                 [ ] BMI > 25 Kg/m2                                            (1 Point)                                  [ ] Serious infection (ie PNA)                            (1 Point)                     [ ] Lung disease ( COPD, Emphysema)            (1 Point)                                                                          [ ] Acute myocardial infarction                         (1 Point)                  [ ] Congestive heart failure (in the previous month)  (1 Point)         [ ] Inflammatory bowel disease                            (1 Point)                  [ ] Central venous access, PICC or Port               (2 points)       (within the last month)                                                                [ ] Stroke (in the previous month)                        (5 Points)    [ ] Previous or present malignancy                       (2 points)                                                                                                                                                         HEMATOLOGY RELATED FACTORS                                                         [ x] Prior episodes of VTE                                     (3 Points)                     [ ] Positive family history for VTE                      (3 Points)                  [ ] Prothrombin 87383 A                                     (3 Points)                     [ ] Factor V Leiden                                                (3 Points)                        [ ] Lupus anticoagulants                                      (3 Points)                                                           [ ] Anticardiolipin antibodies                              (3 Points)                                                       [ ] High homocysteine in the blood                   (3 Points)                                             [ ] Other congenital or acquired thrombophilia      (3 Points)                                                [ ] Heparin induced thrombocytopenia                  (3 Points)                                        MOBILITY RELATED FACTORS  [ ] Bed rest                                                         (1 Point)  [ ] Plaster cast                                                    (2 points)  [ ] Bed bound for more than 72 hours           (2 Points)    GENDER SPECIFIC FACTORS  [ ] Pregnancy or had a baby within the last month   (1 Point)  [ ] Post-partum < 6 weeks                                   (1 Point)  [ ] Hormonal therapy  or oral contraception   (1 Point)  [ ] History of pregnancy complications              (1 point)  [ ] Unexplained or recurrent              (1 Point)    OTHER RISK FACTORS                                           (1 Point)  [x ] BMI >40, smoking, diabetes requiring insulin, chemotherapy  blood transfusions and length of surgery over 2 hours    SURGERY RELATED RISK FACTORS  [ ]  Section within the last month     (1 Point)  [ ] Minor surgery                                                  (1 Point)  [ ] Arthroscopic surgery                                       (2 Points)  [x ] Planned major surgery lasting more            (2 Points)      than 45 minutes     [ ] Elective hip or knee joint replacement       (5 points)       surgery                                                TRAUMA RELATED RISK FACTORS  [ ] Fracture of the hip, pelvis, or leg                       (5 Points)  [ ] Spinal cord injury resulting in paralysis             (5 points)       (in the previous month)    [ ] Paralysis  (less than 1 month)                             (5 Points)  [ ] Multiple Trauma within 1 month                        (5 Points)    Total Score [  7      ]    Caprini Score 0-2: Low Risk, NO VTE prophylaxis required for most patients, encourage ambulation  Caprini Score 3-6: Moderate Risk , pharmacologic VTE prophylaxis is indicated for most patients (in the absence of contraindications)  Caprini Score Greater than or =7: High risk, pharmocologic VTE prophylaxis indicated for most patients (in the absence of contraindications)    OPIOID RISK TOOL    [place] EACH BOX THAT APPLIES AND ADD TOTALS AT THE END    FAMILY HISTORY OF SUBSTANCE ABUSE                 FEMALE         MALE                                                Alcohol                             [  ]1 pt          [  ]3pts                                               Illegal Durgs                     [  ]2 pts        [  ]3pts                                               Rx Drugs                           [  ]4 pts        [  ]4 pts    PERSONAL HISTORY OF SUBSTANCE ABUSE                                                                                          Alcohol                             [ x ]3 pts       [  ]3 pts                                               Illegal Drugs                     [  ]4 pts        [  ]4 pts                                               Rx Drugs                           [  ]5 pts        [  ]5 pts    AGE BETWEEN 16-45 YEARS                                      [  ]1 pt         [  ]1 pt    HISTORY OF PREADOLESCENT   SEXUAL ABUSE                                                             [  ]3 pts        [  ]0pts    PSYCHOLOGICAL DISEASE                     ADD, OCD, Bipolar, Schizophrenia        [  ]2 pts         [  ]2 pts                      Depression                                               [  ]1 pt           [  ]1 pt           SCORING TOTAL   (add numbers and type here)              (*3*)                                     A score of 3 or lower indicated LOW risk for future opioid abuse  A score of 4 to 7 indicated moderate risk for future opioid abuse  A score of 8 or higher indicates a high risk for opioid abuse

## 2024-03-12 NOTE — H&P PST ADULT - NEGATIVE GENERAL SYMPTOMS
No
no fever/no chills/no sweating/no anorexia/no weight loss/no polyphagia/no polyuria/no polydipsia/no malaise/no fatigue

## 2024-03-12 NOTE — H&P PST ADULT - HISTORY OF PRESENT ILLNESS
52 yo female presents for PST with PMH of of gastric bypass, cholecystectomy, gastric ulcers, GERD, hernia repair, active smoker on suboxone. Patient presents with 6-7 months of right leg pain in an L5 distribution that is referrable to the severe right foraminal stenosis seen at L5-S1.  Pt scheduled for a Right L5-S1 Decompression scheduled 3/14/24 with Dr. Loyola.       Chief Complaint: right l5 radicular leg pain   Ms. Enma Can is a very pleasant 53 year old female with a history of gastric bypass, cholecystectomy, gastric ulcers, GERD, hernia repair, active smoker on suboxone who presents with 6-7 months of right leg pain. She denies an inciting event but does note that she fell down the stairs a few months prior. Her right leg pain involves her right buttock and then down the side of her leg to the outside of her ankle into the top of her foot. The pain previously was a stabbing pain but she underwent injections with Dr. Hale and now it is just a throbbing pain. The pain remains constant though. She is on gabapentin and Suboxone. Placing frozen ice bottles against her skin makes the pain a little bit better. Sitting for too long and any movement makes the pain worse. She is also taking Motrin. She denies any numbness tingling. She has not had any recent physical therapy.    Pain management injections:  2/2024: right SI joint  12/2023: right L5-S1 TFESI  11/2023: right L5-S1 LESI  11/2023: right SI joint    PMH/PSH: as above  Active smoker  Lives at home with  who was recently diagnosed with stage 4 cancer, stepdaughter, nephew  Works for landscaping company     Active Problems   Foraminal stenosis of lumbar region (724.02) (M48.061)  Gastric ulcer (531.90) (K25.9)  H/O gastric bypass (V45.86) (Z98.84)  Lumbar radiculopathy, acute (724.4) (M54.16)  S/P cholecystectomy (V45.79) (Z90.49)  S/P hernia surgery (V45.89) (Z98.890,Z87.19)           Surgical History   History of Gastric bypass surgery         Family History   Family history of hypertension (V17.49) (Z82.49) : Mother         Social History   Current smoker (305.1) (F17.200)  No alcohol use  No illicit drug use 52 yo female presents for PST with PMH of of gastric bypass, cholecystectomy, gastric ulcers, GERD, hernia repair, active smoker on suboxone. Patient presents with 6-7 months of right leg pain in an L5 distribution that is referable to the severe right foraminal stenosis seen at L5-S1. Patient complaining of right gluteal down to right foot. Pain is described as sharp and shooting pain. Pain is worse with walking, stairs, standing. Patient has been taking Gabapentin, suboxon, Motrin with no relief. Pt scheduled for a Right L5-S1 Decompression scheduled 3/14/24 with Dr. Loyola.       Chief Complaint: right l5 radicular leg pain   Ms. Enma Can is a very pleasant 53 year old female with a history of gastric bypass, cholecystectomy, gastric ulcers, GERD, hernia repair, active smoker on suboxone who presents with 6-7 months of right leg pain. She denies an inciting event but does note that she fell down the stairs a few months prior. Her right leg pain involves her right buttock and then down the side of her leg to the outside of her ankle into the top of her foot. The pain previously was a stabbing pain but she underwent injections with Dr. Hale and now it is just a throbbing pain. The pain remains constant though. She is on gabapentin and Suboxone. Placing frozen ice bottles against her skin makes the pain a little bit better. Sitting for too long and any movement makes the pain worse. She is also taking Motrin. She denies any numbness tingling. She has not had any recent physical therapy.    Pain management injections:  2/2024: right SI joint  12/2023: right L5-S1 TFESI  11/2023: right L5-S1 LESI  11/2023: right SI joint    PMH/PSH: as above  Active smoker  Lives at home with  who was recently diagnosed with stage 4 cancer, stepdaughter, nephew  Works for landscaping company     Active Problems   Foraminal stenosis of lumbar region (724.02) (M48.061)  Gastric ulcer (531.90) (K25.9)  H/O gastric bypass (V45.86) (Z98.84)  Lumbar radiculopathy, acute (724.4) (M54.16)  S/P cholecystectomy (V45.79) (Z90.49)  S/P hernia surgery (V45.89) (Z98.890,Z87.19)           Surgical History   History of Gastric bypass surgery         Family History   Family history of hypertension (V17.49) (Z82.49) : Mother         Social History   Current smoker (305.1) (F17.200)  No alcohol use  No illicit drug use 54 yo female presents for PST with PMH of of gastric bypass, cholecystectomy, gastric ulcers, GERD, hernia repair, active smoker on suboxone. Patient presents with 6-7 months of right leg pain in an L5 distribution that is referable to the severe right foraminal stenosis seen at L5-S1. Patient complaining of right gluteal down to right foot. Pain is described as sharp and shooting pain. Pain rated an 8/10. Pain is worse with walking, stairs, standing. Patient has been taking Gabapentin, suboxon, Motrin with no relief. Pt scheduled for a Right L5-S1 Decompression scheduled 3/14/24 with Dr. Loyola.       Chief Complaint: right l5 radicular leg pain   Ms. Enma Can is a very pleasant 53 year old female with a history of gastric bypass, cholecystectomy, gastric ulcers, GERD, hernia repair, active smoker on suboxone who presents with 6-7 months of right leg pain. She denies an inciting event but does note that she fell down the stairs a few months prior. Her right leg pain involves her right buttock and then down the side of her leg to the outside of her ankle into the top of her foot. The pain previously was a stabbing pain but she underwent injections with Dr. Hale and now it is just a throbbing pain. The pain remains constant though. She is on gabapentin and Suboxone. Placing frozen ice bottles against her skin makes the pain a little bit better. Sitting for too long and any movement makes the pain worse. She is also taking Motrin. She denies any numbness tingling. She has not had any recent physical therapy.    Pain management injections:  2/2024: right SI joint  12/2023: right L5-S1 TFESI  11/2023: right L5-S1 LESI  11/2023: right SI joint    PMH/PSH: as above  Active smoker  Lives at home with  who was recently diagnosed with stage 4 cancer, stepdaughter, nephew  Works for landscaping company     Active Problems   Foraminal stenosis of lumbar region (724.02) (M48.061)  Gastric ulcer (531.90) (K25.9)  H/O gastric bypass (V45.86) (Z98.84)  Lumbar radiculopathy, acute (724.4) (M54.16)  S/P cholecystectomy (V45.79) (Z90.49)  S/P hernia surgery (V45.89) (Z98.890,Z87.19)           Surgical History   History of Gastric bypass surgery         Family History   Family history of hypertension (V17.49) (Z82.49) : Mother         Social History   Current smoker (305.1) (F17.200)  No alcohol use  No illicit drug use 54 yo female presents for PST with PMH of of gastric bypass, cholecystectomy, gastric ulcers, GERD, hernia repair, active smoker, ETOH dependance, in AA, last drink 1/2023, on Suboxone, right lumbar radiculopathy. Patient presents with 6-7 months of right leg pain in an L5 distribution that is referable to the severe right foraminal stenosis seen at L5-S1. Pain located in right gluteal with radiation down to right foot. Pain is described as sharp and shooting pain. Pain rated an 8/10 continuous throughout the day. Pain is worse with walking, stairs, standing. Patient has been taking Gabapentin, Suboxone Motrin with no relief. Pt scheduled for a Right L5-S1 Decompression scheduled 3/14/24 with Dr. Loyola.       Chief Complaint: right l5 radicular leg pain   Ms. Enma Can is a very pleasant 53 year old female with a history of gastric bypass, cholecystectomy, gastric ulcers, GERD, hernia repair, active smoker on suboxone who presents with 6-7 months of right leg pain. She denies an inciting event but does note that she fell down the stairs a few months prior. Her right leg pain involves her right buttock and then down the side of her leg to the outside of her ankle into the top of her foot. The pain previously was a stabbing pain but she underwent injections with Dr. Hale and now it is just a throbbing pain. The pain remains constant though. She is on gabapentin and Suboxone. Placing frozen ice bottles against her skin makes the pain a little bit better. Sitting for too long and any movement makes the pain worse. She is also taking Motrin. She denies any numbness tingling. She has not had any recent physical therapy.    Pain management injections:  2/2024: right SI joint  12/2023: right L5-S1 TFESI  11/2023: right L5-S1 LESI  11/2023: right SI joint    PMH/PSH: as above  Active smoker  Lives at home with  who was recently diagnosed with stage 4 cancer, stepdaughter, nephew  Works for landscaping company     Active Problems   Foraminal stenosis of lumbar region (724.02) (M48.061)  Gastric ulcer (531.90) (K25.9)  H/O gastric bypass (V45.86) (Z98.84)  Lumbar radiculopathy, acute (724.4) (M54.16)  S/P cholecystectomy (V45.79) (Z90.49)  S/P hernia surgery (V45.89) (Z98.890,Z87.19)           Surgical History   History of Gastric bypass surgery         Family History   Family history of hypertension (V17.49) (Z82.49) : Mother         Social History   Current smoker (305.1) (F17.200)  No alcohol use  No illicit drug use 52 yo female presents for PST with PMH of of gastric bypass, cholecystectomy, gastric ulcers, GERD, hernia repair, active smoker, ETOH dependance, in AA, last drink 1/2023, on Suboxone, right lumbar radiculopathy. Patient presents with 6-7 months of right leg pain in an L5 distribution that is referable to the severe right foraminal stenosis seen at L5-S1. Pain located in right gluteal with radiation down to right foot. Pain is described as sharp and shooting pain. Pain rated an 8/10 continuous throughout the day. Pain is worse with walking, stairs, standing. Patient has been taking Gabapentin, Suboxone Motrin with no relief. Placing frozen ice bottles against her skin makes the pain a little bit better. Sitting for too long and any movement makes the pain worse. She is also taking Motrin. She denies any numbness tingling. She has not had any recent physical therapy. Pt denies bowel/bladder function changes, dizziness, CP, SOB, fever/chills, nausea/vomiting. Pt scheduled for a Right L5-S1 Decompression scheduled 3/14/24 with Dr. Loyola.

## 2024-03-12 NOTE — HISTORY OF PRESENT ILLNESS
[FreeTextEntry1] : Ms. Enma Can is a very pleasant 53 year old female with a history of gastric bypass, cholecystectomy, gastric ulcers, GERD, hernia repair, active smoker on suboxone who presents with 6-7 months of right leg pain consistent with an L5 radiculopathy secondary to right L5-S1 stenosis. She continues to have severe pain that involves her right buttock down the side of her leg to the outside of her ankle to the top of her foot. She is on suboxone 8-2 twice a day.  Pain management injections: 2/2024: right SI joint 12/2023: right L5-S1 TFESI 11/2023: right L5-S1 LESI 11/2023: right SI joint  PMH/PSH: as above Active smoker Lives at home with  who was recently diagnosed with stage 4 cancer, stepdaughter, nephew Works for landscaping company

## 2024-03-12 NOTE — ASSESSMENT
[FreeTextEntry1] : Ms. Enma Can is a very pleasant 53 year old female with a history of gastric bypass, cholecystectomy, gastric ulcers, GERD, hernia repair, active smoker on suboxone who presents with 6-7 months of right leg pain in an L5 distribution that is referrable to the severe right foraminal stenosis seen at L5-S1. I reviewed her imaging with her and discussed her treatment options for continued conservative management vs. surgery in the form of a right sided L5-S1 decompression. Given her persistent pain despite several months of conservative therapy and injections, she would like to pursue surgery. I reviewed the details of surgery, anticipated recovery, and risks of surgery at length. The risks of this surgery include, but are not limited to bleeding; infection; death; myocardial infarction (heart attack); cerebrovascular accident (stroke); coma; PE (blood clot of the lung); DVT (blood clot of the leg); neurologic injury causing worsening pain; numbness; weakness; paralysis; bowel, bladder, or sexual dysfunction; cerebrospinal fluid leak; meningitis; potential need for future fusion surgery; failure to relieve the symptoms/persistent symptoms; and necessity for future and additional surgery including for bleeding, infection, hematoma, CSF leak, misplaced hardware, hardware failure, pseudoarthrosis, adjacent segment disease, or otherwise. In particular, I discussed that she may still have persistent leg pain or numbness.  Also because of her smoking, she is at high risk of postoperative infection.  Because she is on Suboxone, I have reached out to her pain management doctor, Dr. Gerardo Street to discuss her postoperative regimen. Surgery planned for next week March 19, 2024. Consent signed in surgery and a copy of the consent was provided to her.

## 2024-03-12 NOTE — H&P PST ADULT - MUSCULOSKELETAL
details… normal/ROM intact/normal gait/strength 5/5 bilateral upper extremities/strength 5/5 bilateral lower extremities strength 5/5 bilateral upper extremities/strength 5/5 bilateral lower extremities

## 2024-03-14 NOTE — PROVIDER CONTACT NOTE (OTHER) - ACTION/TREATMENT ORDERED:
Written class material given with telephone review of program.  All questions answered, contact information  given.

## 2024-03-18 ENCOUNTER — TRANSCRIPTION ENCOUNTER (OUTPATIENT)
Age: 54
End: 2024-03-18

## 2024-03-18 PROBLEM — Z87.11 PERSONAL HISTORY OF PEPTIC ULCER DISEASE: Chronic | Status: ACTIVE | Noted: 2024-03-12

## 2024-03-18 PROBLEM — M54.16 RADICULOPATHY, LUMBAR REGION: Chronic | Status: ACTIVE | Noted: 2024-03-12

## 2024-03-18 PROBLEM — D21.9 BENIGN NEOPLASM OF CONNECTIVE AND OTHER SOFT TISSUE, UNSPECIFIED: Chronic | Status: ACTIVE | Noted: 2024-03-12

## 2024-03-18 NOTE — ASU PATIENT PROFILE, ADULT - NSICDXPASTMEDICALHX_GEN_ALL_CORE_FT
PAST MEDICAL HISTORY:  ETOHism     Fibroids     GERD (gastroesophageal reflux disease)     History of stomach ulcers     Right lumbar radiculopathy

## 2024-03-19 ENCOUNTER — INPATIENT (INPATIENT)
Facility: HOSPITAL | Age: 54
LOS: 0 days | Discharge: HOME | DRG: 516 | End: 2024-03-20
Attending: STUDENT IN AN ORGANIZED HEALTH CARE EDUCATION/TRAINING PROGRAM | Admitting: STUDENT IN AN ORGANIZED HEALTH CARE EDUCATION/TRAINING PROGRAM
Payer: COMMERCIAL

## 2024-03-19 ENCOUNTER — APPOINTMENT (OUTPATIENT)
Dept: NEUROSURGERY | Facility: HOSPITAL | Age: 54
End: 2024-03-19

## 2024-03-19 VITALS
SYSTOLIC BLOOD PRESSURE: 131 MMHG | RESPIRATION RATE: 16 BRPM | HEIGHT: 65 IN | OXYGEN SATURATION: 97 % | TEMPERATURE: 98 F | DIASTOLIC BLOOD PRESSURE: 88 MMHG | WEIGHT: 134.04 LBS | HEART RATE: 80 BPM

## 2024-03-19 DIAGNOSIS — Z98.890 OTHER SPECIFIED POSTPROCEDURAL STATES: Chronic | ICD-10-CM

## 2024-03-19 DIAGNOSIS — Z98.84 BARIATRIC SURGERY STATUS: Chronic | ICD-10-CM

## 2024-03-19 DIAGNOSIS — Z90.49 ACQUIRED ABSENCE OF OTHER SPECIFIED PARTS OF DIGESTIVE TRACT: Chronic | ICD-10-CM

## 2024-03-19 DIAGNOSIS — M54.16 RADICULOPATHY, LUMBAR REGION: ICD-10-CM

## 2024-03-19 LAB
ABO RH CONFIRMATION: SIGNIFICANT CHANGE UP
ALBUMIN SERPL ELPH-MCNC: 3.2 G/DL — LOW (ref 3.3–5.2)
ALP SERPL-CCNC: 65 U/L — SIGNIFICANT CHANGE UP (ref 40–120)
ALT FLD-CCNC: 17 U/L — SIGNIFICANT CHANGE UP
AST SERPL-CCNC: 28 U/L — SIGNIFICANT CHANGE UP
BILIRUB DIRECT SERPL-MCNC: 0.1 MG/DL — SIGNIFICANT CHANGE UP (ref 0–0.3)
BILIRUB INDIRECT FLD-MCNC: 0.1 MG/DL — LOW (ref 0.2–1)
BILIRUB SERPL-MCNC: 0.2 MG/DL — LOW (ref 0.4–2)
INR BLD: 0.84 RATIO — LOW (ref 0.85–1.18)
PROT SERPL-MCNC: 5.6 G/DL — LOW (ref 6.6–8.7)
PROTHROM AB SERPL-ACNC: 9.4 SEC — LOW (ref 9.5–13)

## 2024-03-19 PROCEDURE — 63047 LAM FACETEC & FORAMOT LUMBAR: CPT

## 2024-03-19 PROCEDURE — 72110 X-RAY EXAM L-2 SPINE 4/>VWS: CPT | Mod: 26

## 2024-03-19 PROCEDURE — 63047 LAM FACETEC & FORAMOT LUMBAR: CPT | Mod: AS

## 2024-03-19 DEVICE — IMPLANTABLE DEVICE: Type: IMPLANTABLE DEVICE | Status: FUNCTIONAL

## 2024-03-19 DEVICE — FLOSEAL WITH RECOTHROM THROMBIN 10ML: Type: IMPLANTABLE DEVICE | Status: FUNCTIONAL

## 2024-03-19 DEVICE — SURGIFOAM PAD 8CM X 12.5CM X 10MM (100): Type: IMPLANTABLE DEVICE | Status: FUNCTIONAL

## 2024-03-19 RX ORDER — ARIPIPRAZOLE 15 MG/1
1 TABLET ORAL
Refills: 0 | DISCHARGE

## 2024-03-19 RX ORDER — QUETIAPINE FUMARATE 200 MG/1
400 TABLET, FILM COATED ORAL AT BEDTIME
Refills: 0 | Status: DISCONTINUED | OUTPATIENT
Start: 2024-03-19 | End: 2024-03-20

## 2024-03-19 RX ORDER — CELECOXIB 200 MG/1
200 CAPSULE ORAL EVERY 12 HOURS
Refills: 0 | Status: DISCONTINUED | OUTPATIENT
Start: 2024-03-19 | End: 2024-03-20

## 2024-03-19 RX ORDER — SODIUM CHLORIDE 9 MG/ML
3 INJECTION INTRAMUSCULAR; INTRAVENOUS; SUBCUTANEOUS EVERY 8 HOURS
Refills: 0 | Status: DISCONTINUED | OUTPATIENT
Start: 2024-03-19 | End: 2024-03-19

## 2024-03-19 RX ORDER — CEFAZOLIN SODIUM 1 G
2000 VIAL (EA) INJECTION ONCE
Refills: 0 | Status: DISCONTINUED | OUTPATIENT
Start: 2024-03-19 | End: 2024-03-19

## 2024-03-19 RX ORDER — QUETIAPINE FUMARATE 200 MG/1
1 TABLET, FILM COATED ORAL
Refills: 0 | DISCHARGE

## 2024-03-19 RX ORDER — FENTANYL CITRATE 50 UG/ML
50 INJECTION INTRAVENOUS
Refills: 0 | Status: DISCONTINUED | OUTPATIENT
Start: 2024-03-19 | End: 2024-03-19

## 2024-03-19 RX ORDER — ARIPIPRAZOLE 15 MG/1
20 TABLET ORAL AT BEDTIME
Refills: 0 | Status: DISCONTINUED | OUTPATIENT
Start: 2024-03-19 | End: 2024-03-20

## 2024-03-19 RX ORDER — ACETAMINOPHEN 500 MG
975 TABLET ORAL EVERY 6 HOURS
Refills: 0 | Status: DISCONTINUED | OUTPATIENT
Start: 2024-03-19 | End: 2024-03-20

## 2024-03-19 RX ORDER — GABAPENTIN 400 MG/1
800 CAPSULE ORAL
Refills: 0 | Status: DISCONTINUED | OUTPATIENT
Start: 2024-03-19 | End: 2024-03-20

## 2024-03-19 RX ORDER — BUPRENORPHINE AND NALOXONE 2; .5 MG/1; MG/1
2 TABLET SUBLINGUAL
Refills: 0 | DISCHARGE

## 2024-03-19 RX ORDER — BUPRENORPHINE AND NALOXONE 2; .5 MG/1; MG/1
1 TABLET SUBLINGUAL
Refills: 0 | Status: DISCONTINUED | OUTPATIENT
Start: 2024-03-19 | End: 2024-03-20

## 2024-03-19 RX ORDER — FENTANYL CITRATE 50 UG/ML
30 INJECTION INTRAVENOUS
Refills: 0 | Status: DISCONTINUED | OUTPATIENT
Start: 2024-03-19 | End: 2024-03-20

## 2024-03-19 RX ORDER — ONDANSETRON 8 MG/1
4 TABLET, FILM COATED ORAL EVERY 6 HOURS
Refills: 0 | Status: DISCONTINUED | OUTPATIENT
Start: 2024-03-19 | End: 2024-03-20

## 2024-03-19 RX ORDER — FENTANYL CITRATE 50 UG/ML
25 INJECTION INTRAVENOUS
Refills: 0 | Status: DISCONTINUED | OUTPATIENT
Start: 2024-03-19 | End: 2024-03-19

## 2024-03-19 RX ORDER — PANTOPRAZOLE SODIUM 20 MG/1
40 TABLET, DELAYED RELEASE ORAL DAILY
Refills: 0 | Status: DISCONTINUED | OUTPATIENT
Start: 2024-03-19 | End: 2024-03-20

## 2024-03-19 RX ORDER — METHOCARBAMOL 500 MG/1
750 TABLET, FILM COATED ORAL EVERY 8 HOURS
Refills: 0 | Status: DISCONTINUED | OUTPATIENT
Start: 2024-03-19 | End: 2024-03-20

## 2024-03-19 RX ORDER — SENNA PLUS 8.6 MG/1
2 TABLET ORAL AT BEDTIME
Refills: 0 | Status: DISCONTINUED | OUTPATIENT
Start: 2024-03-19 | End: 2024-03-20

## 2024-03-19 RX ORDER — ACETAMINOPHEN 500 MG
975 TABLET ORAL ONCE
Refills: 0 | Status: COMPLETED | OUTPATIENT
Start: 2024-03-19 | End: 2024-03-19

## 2024-03-19 RX ORDER — ACETAMINOPHEN 500 MG
1000 TABLET ORAL ONCE
Refills: 0 | Status: COMPLETED | OUTPATIENT
Start: 2024-03-19 | End: 2024-03-19

## 2024-03-19 RX ORDER — KETOROLAC TROMETHAMINE 30 MG/ML
30 SYRINGE (ML) INJECTION ONCE
Refills: 0 | Status: DISCONTINUED | OUTPATIENT
Start: 2024-03-19 | End: 2024-03-19

## 2024-03-19 RX ORDER — SODIUM CHLORIDE 9 MG/ML
1000 INJECTION INTRAMUSCULAR; INTRAVENOUS; SUBCUTANEOUS
Refills: 0 | Status: DISCONTINUED | OUTPATIENT
Start: 2024-03-19 | End: 2024-03-20

## 2024-03-19 RX ORDER — POLYETHYLENE GLYCOL 3350 17 G/17G
17 POWDER, FOR SOLUTION ORAL DAILY
Refills: 0 | Status: DISCONTINUED | OUTPATIENT
Start: 2024-03-19 | End: 2024-03-20

## 2024-03-19 RX ORDER — NALOXONE HYDROCHLORIDE 4 MG/.1ML
0.1 SPRAY NASAL
Refills: 0 | Status: DISCONTINUED | OUTPATIENT
Start: 2024-03-19 | End: 2024-03-20

## 2024-03-19 RX ORDER — ONDANSETRON 8 MG/1
4 TABLET, FILM COATED ORAL ONCE
Refills: 0 | Status: DISCONTINUED | OUTPATIENT
Start: 2024-03-19 | End: 2024-03-19

## 2024-03-19 RX ADMIN — FENTANYL CITRATE 50 MICROGRAM(S): 50 INJECTION INTRAVENOUS at 12:30

## 2024-03-19 RX ADMIN — FENTANYL CITRATE 50 MICROGRAM(S): 50 INJECTION INTRAVENOUS at 12:40

## 2024-03-19 RX ADMIN — FENTANYL CITRATE 30 MILLILITER(S): 50 INJECTION INTRAVENOUS at 19:09

## 2024-03-19 RX ADMIN — ARIPIPRAZOLE 20 MILLIGRAM(S): 15 TABLET ORAL at 21:35

## 2024-03-19 RX ADMIN — METHOCARBAMOL 750 MILLIGRAM(S): 500 TABLET, FILM COATED ORAL at 21:34

## 2024-03-19 RX ADMIN — Medication 975 MILLIGRAM(S): at 23:58

## 2024-03-19 RX ADMIN — BUPRENORPHINE AND NALOXONE 1 FILM(S): 2; .5 TABLET SUBLINGUAL at 13:15

## 2024-03-19 RX ADMIN — QUETIAPINE FUMARATE 400 MILLIGRAM(S): 200 TABLET, FILM COATED ORAL at 21:35

## 2024-03-19 RX ADMIN — Medication 30 MILLIGRAM(S): at 13:35

## 2024-03-19 RX ADMIN — GABAPENTIN 800 MILLIGRAM(S): 400 CAPSULE ORAL at 14:00

## 2024-03-19 RX ADMIN — FENTANYL CITRATE 30 MILLILITER(S): 50 INJECTION INTRAVENOUS at 15:51

## 2024-03-19 RX ADMIN — Medication 400 MILLIGRAM(S): at 12:30

## 2024-03-19 RX ADMIN — Medication 30 MILLIGRAM(S): at 14:00

## 2024-03-19 RX ADMIN — CELECOXIB 200 MILLIGRAM(S): 200 CAPSULE ORAL at 18:51

## 2024-03-19 RX ADMIN — SENNA PLUS 2 TABLET(S): 8.6 TABLET ORAL at 21:35

## 2024-03-19 RX ADMIN — Medication 975 MILLIGRAM(S): at 18:13

## 2024-03-19 RX ADMIN — CELECOXIB 200 MILLIGRAM(S): 200 CAPSULE ORAL at 18:14

## 2024-03-19 RX ADMIN — GABAPENTIN 800 MILLIGRAM(S): 400 CAPSULE ORAL at 23:58

## 2024-03-19 RX ADMIN — GABAPENTIN 800 MILLIGRAM(S): 400 CAPSULE ORAL at 18:13

## 2024-03-19 RX ADMIN — Medication 975 MILLIGRAM(S): at 06:45

## 2024-03-19 RX ADMIN — FENTANYL CITRATE 30 MILLILITER(S): 50 INJECTION INTRAVENOUS at 12:55

## 2024-03-19 RX ADMIN — FENTANYL CITRATE 50 MICROGRAM(S): 50 INJECTION INTRAVENOUS at 13:00

## 2024-03-19 RX ADMIN — Medication 1000 MILLIGRAM(S): at 13:00

## 2024-03-19 RX ADMIN — Medication 975 MILLIGRAM(S): at 19:08

## 2024-03-19 RX ADMIN — FENTANYL CITRATE 30 MILLILITER(S): 50 INJECTION INTRAVENOUS at 16:05

## 2024-03-19 NOTE — PATIENT PROFILE ADULT - HAVE YOU RECENTLY LOST WEIGHT WITHOUT TRYING?
Continue: prednisolone acetate (prednisolone acetate): drops,suspension: 1% 1 drop four times a day as directed into left eye 07- No (0)

## 2024-03-19 NOTE — OCCUPATIONAL THERAPY INITIAL EVALUATION ADULT - PERTINENT HX OF CURRENT PROBLEM, REHAB EVAL
As per MD note: 52 yo female presents for PST with PMH of of gastric bypass, cholecystectomy, gastric ulcers, GERD, hernia repair, active smoker, ETOH dependance, in AA, last drink 1/2023, on Suboxone, right lumbar radiculopathy. Patient presents with 6-7 months of right leg pain in an L5 distribution that is referable to the severe right foraminal stenosis seen at L5-S1. Pain located in right gluteal with radiation down to right foot. Pain is described as sharp and shooting pain. Pain rated an 8/10 continuous throughout the day. Pain is worse with walking, stairs, standing. Patient has been taking Gabapentin, Suboxone Motrin with no relief. Placing frozen ice bottles against her skin makes the pain a little bit better. Sitting for too long and any movement makes the pain worse. She is also taking Motrin. She denies any numbness tingling. She has not had any recent physical therapy. Pt denies bowel/bladder function changes, dizziness, CP, SOB, fever/chills, nausea/vomiting. Pt scheduled for a Right L5-S1 Decompression scheduled 3/14/24 with Dr. Loyola.

## 2024-03-19 NOTE — CONSULT NOTE ADULT - ASSESSMENT
52 yo female presents for PST with PMH of of gastric bypass, cholecystectomy, gastric ulcers, GERD, hernia repair, active smoker, ETOH dependance, in AA, last drink 1/2023, on Suboxone, right lumbar radiculopathy.   s/p Right L5-S1 Decompression scheduled 3/14/24 with Dr. Loyola.     plan:  cont suboxone 8mg BID  cont fPCA for overnight      - Recommend starting acetaminophen PO 975mg [id number] standing. HOLD for liver function test dysfunction  - If no contraindication to NSAIDs, recommend starting ketorolac IV 15mg [id number] standing x 4 doses. Afterwards, can use celebrex 200mg PO q12h x 7days, with food. HOLD for black/red stools.  - Recommend starting robaxin 750mg q8h standing. HOLD for sedation  54 yo female presents for PST with PMH of of gastric bypass, cholecystectomy, gastric ulcers, GERD, hernia repair, active smoker, ETOH dependance, in AA, last drink 1/2023, on Suboxone, right lumbar radiculopathy.   s/p Right L5-S1 Decompression scheduled 3/14/24 with Dr. Loyola.     plan:  cont home suboxone 8mg as BID - will transition to TID when closer to [place]  cont fPCA for overnight  cont home tej 800 QID      - Recommend starting acetaminophen PO 975mg [id number] standing. HOLD for liver function test dysfunction  - If no contraindication to NSAIDs, recommend starting ketorolac IV 15mg [id number] standing x 4 doses. Afterwards, can use celebrex 200mg PO q12h x 7days, with food. HOLD for black/red stools.  - Recommend starting robaxin 750mg q8h standing. HOLD for sedation  54 yo female presents for PST with PMH of of gastric bypass, cholecystectomy, gastric ulcers, GERD, hernia repair, active smoker, ETOH dependance, in AA, last drink 1/2023,   hx of ivda - heroin overdose 2017 now on Suboxone 8mg TID  right lumbar radiculopathy s/p Hemilaminectomy, spine, lumbar 19-Mar-2024    seen in PACU   c/o sev hip/buttock pain  denies any radiating pain to foot  discussed continuing suboxone and fent pca in addition to mulitmodal analgesics  pt last took suboxone last night    plan:  cont home suboxone 8mg as BID - will transition to TID when closer to [place]  cont fPCA for overnight  cont home tej 800 QID      - Recommend starting acetaminophen PO 975mg [id number] standing. HOLD for liver function test dysfunction  - If no contraindication to NSAIDs, recommend starting ketorolac IV 15mg [id number] standing x 4 doses. Afterwards, can use celebrex 200mg PO q12h x 7days, with food. HOLD for black/red stools.  - Recommend starting robaxin 750mg q8h standing. HOLD for sedation

## 2024-03-19 NOTE — OCCUPATIONAL THERAPY INITIAL EVALUATION ADULT - LEVEL OF INDEPENDENCE: STAND/SIT, REHAB EVAL
DATE: 5/9/20     EEG NUMBER: [id number]     REFERRING PHYSICIAN:  Dr. Cristobal      This EEG was performed to assess for status epilepticus      ELECTROENCEPHALOGRAM REPORT     METHODOLOGY:  Electroencephalographic (EEG) is recorded with electrodes placed according to the International 10-20 placement system.  Thirty two (32) channels of digital signal (sampling rate of 512/sec), including T1 and T2, were simultaneously recorded from the scalp and may include EKG, EMG, and/or eye monitors.  Recording band pass was 0.1 to 512 Hz.  Digital video recording of the patient is simultaneously recorded with the EEG.  The patient is instructed to report clinical symptoms which may occur during the recording session.  EEG and video recording are stored and archived in digital format.  Activation procedures, which include photic stimulation, hyperventilation and instructing patients to perform simple tasks, are done in selected patients.     The EEG is displayed on a monitor screen and can be reviewed using different montages.  Computer-assisted analysis is employed to detect spike and electrographic seizure activity.  The entire record is submitted for computer analysis.  The entire recording is visually reviewed, and the times identified by computer analysis as being spikes or seizures are reviewed again.     Compressed spectral analysis (CSA) is also performed on the activity recorded from each individual channel.  This is displayed as a power display of frequencies from 0 to 30 Hz over time.  The CSA is reviewed looking for asymmetries in power between homologous areas of the scalp, then compared with the original EEG recording.     [employer] software was also utilized in the review of this study.  This software suite analyzes the EEG recording in multiple domains.  Coherence and rhythmicity are computed to identify EEG sections which may contain organized seizures.  Each channel undergoes analysis to detect the presence of  spike and sharp waves which have special and morphological characteristics of epileptic activity.  The routine EEG recording is converted from special into frequency domain.  This is then displayed comparing homologous areas to identify areas of significant asymmetry.  Algorithm to identify non-cortically generated artifact is used to separate artifact from the EEG.     Recording time  Start on May 9, 2020 at hours 7 min 0 sec 58   End on May 9, 2020 at hours 17 min 48 sec 30  The total time of EEG recording for the study was 10 hr and 46 min     EEG FINDINGS:  The recording was obtained with a number of standard bipolar and referential montages during comatose state.  In this state a diffusely suppressed background was noted.  Reactivity was present.  Minimal variability was noted. There were no interictal epileptiform abnormalities and no clinical or electrographic seizures were recorded.  No clear state changes were appreciated     The EKG channel revealed a sinus rhythm.  By hours 16 min 22 sec 11 on May 9, 2020 slowing of the heart rate was noted.  By hours 16 min 24 sec 35 severe bradycardia was noted.  By hours 16 min 25 sec 56 cardiac pauses were noted.  By hours 16 min 31 sec 33 the patient went into asystole.     IMPRESSION:  This is an abnormal EEG during comatose state.  A diffusely suppressed background with minimal variability was noted     CLINICAL CORRELATION:  The patient is a 42 year-old male who presented after ventricular fibrillation.  The patient is currently not maintained on any antiseizure medications.  This is an abnormal EEG during comatose state.  Diffuse suppression of the background was noted suggestive of global severe dysfunction.. Minimal variability was noted during this study.  No seizures were recorded.  The patient  during this study as a consequence of cardiac arrest.    supervision

## 2024-03-19 NOTE — PHYSICAL THERAPY INITIAL EVALUATION ADULT - ADDITIONAL COMMENTS
Pt reports living with spouse in a house with [address] c rail, and resides on the main level. Pt amb with [facility] and is independent with functional mobility, ADLs, and IADLs. Pt drives and is currently working. Pt has support of spouse (however has medical condition). Pt owns [employer].

## 2024-03-19 NOTE — PROGRESS NOTE ADULT - ASSESSMENT
53 yo female s/p a right hemilaminectomy L5-S1     Plan:   -Admit to Dr. Ventura with q4 hour neurochecks and vitals  -Pain control: Pain management consult with Dr. Webb, Continue Suboxone (home medication, started Fentanyl PCA and Robaxin  -GI ppx: Continue Protonix  -ADAT, IVF while advancing diet  -Continue home medication: Abilify, Gabapentin and Seroquel  -Bowel Regimen: Continue Senna and Miralax  -Case and plan discussed with Dr. Ventura

## 2024-03-19 NOTE — CONSULT NOTE ADULT - NSCONSULTADDITIONALINFOA_GEN_ALL_CORE
Reference #: [id number]      PDI	Current Rx	Drug Type	Rx Written	Rx Dispensed	Drug	Quantity	Days Supply	Prescriber Name	Prescriber TEODORA #	Payment Method	Dispenser  A	Y	O	02/15/2024	02/19/2024	buprenorphine-naloxone 8-2 mg sl film	90	[address]	[id number]	Medicaid	[facility] [id number]  A	N	O	02/12/2024	02/13/2024	buprenorphine-naloxone 8-2 mg sl film	21	7	[address]in[address] Keller	[id number]	Medicaid	[facility] [id number]  A	N	O	01/03/2024	01/09/2024	buprenorphine-naloxone 8-2 mg sl film	90	[address]	[id number]	Medicaid	[facility] [id number]  A	N	O	11/08/2023	12/10/2023	buprenorphine-naloxone 8-2 mg sl film	90	[address]	[id number]	Medicaid	[facility] [id number]  A	N	O	11/08/2023	11/11/2023	buprenorphine-naloxone 8-2 mg sl film	90	[address]	[id number]	Medicaid	[facility] [id number]  A	N	O	10/04/2023	10/06/2023	buprenorphine-naloxone 8-2 mg sl film	90	[address]	[id number]	Medicaid	[facility] [id number]  A	N	O	09/06/2023	09/07/2023	buprenorphine-naloxone 8-2 mg sl film	90	[address]	[id number]	Medicaid	[facility] [id number]  A	N	O	07/24/2023	07/26/2023	buprenorphine-naloxone 8-2 mg sl film	90	[address]	[id number]	Medicaid	[facility] [id number]  A	N	O	07/07/2023	07/10/2023	buprenorphine-naloxone 8-2 mg sl film	45	[address]	[id number]	Medicaid	[facility] [id number]  A	N	O	06/06/2023	06/11/2023	buprenorphine-naloxone 8-2 mg sl film	90	[address]	[id number]	Medicaid	[facility] [id number]

## 2024-03-20 ENCOUNTER — TRANSCRIPTION ENCOUNTER (OUTPATIENT)
Age: 54
End: 2024-03-20

## 2024-03-20 VITALS
RESPIRATION RATE: 18 BRPM | HEART RATE: 86 BPM | TEMPERATURE: 98 F | DIASTOLIC BLOOD PRESSURE: 89 MMHG | SYSTOLIC BLOOD PRESSURE: 135 MMHG | OXYGEN SATURATION: 94 %

## 2024-03-20 LAB
ANION GAP SERPL CALC-SCNC: 12 MMOL/L — SIGNIFICANT CHANGE UP (ref 5–17)
BUN SERPL-MCNC: 8.8 MG/DL — SIGNIFICANT CHANGE UP (ref 8–20)
CALCIUM SERPL-MCNC: 9.2 MG/DL — SIGNIFICANT CHANGE UP (ref 8.4–10.5)
CHLORIDE SERPL-SCNC: 106 MMOL/L — SIGNIFICANT CHANGE UP (ref 96–108)
CO2 SERPL-SCNC: 22 MMOL/L — SIGNIFICANT CHANGE UP (ref 22–29)
CREAT SERPL-MCNC: 0.57 MG/DL — SIGNIFICANT CHANGE UP (ref 0.5–1.3)
EGFR: 109 ML/MIN/1.73M2 — SIGNIFICANT CHANGE UP
GLUCOSE SERPL-MCNC: 104 MG/DL — HIGH (ref 70–99)
HCT VFR BLD CALC: 39.6 % — SIGNIFICANT CHANGE UP (ref 34.5–45)
HGB BLD-MCNC: 12.6 G/DL — SIGNIFICANT CHANGE UP (ref 11.5–15.5)
MAGNESIUM SERPL-MCNC: 1.7 MG/DL — SIGNIFICANT CHANGE UP (ref 1.6–2.6)
MCHC RBC-ENTMCNC: 28.1 PG — SIGNIFICANT CHANGE UP (ref 27–34)
MCHC RBC-ENTMCNC: 31.8 GM/DL — LOW (ref 32–36)
MCV RBC AUTO: 88.4 FL — SIGNIFICANT CHANGE UP (ref 80–100)
PHOSPHATE SERPL-MCNC: 3.6 MG/DL — SIGNIFICANT CHANGE UP (ref 2.4–4.7)
PLATELET # BLD AUTO: 252 K/UL — SIGNIFICANT CHANGE UP (ref 150–400)
POTASSIUM SERPL-MCNC: 4.2 MMOL/L — SIGNIFICANT CHANGE UP (ref 3.5–5.3)
POTASSIUM SERPL-SCNC: 4.2 MMOL/L — SIGNIFICANT CHANGE UP (ref 3.5–5.3)
RBC # BLD: 4.48 M/UL — SIGNIFICANT CHANGE UP (ref 3.8–5.2)
RBC # FLD: 13.3 % — SIGNIFICANT CHANGE UP (ref 10.3–14.5)
SODIUM SERPL-SCNC: 140 MMOL/L — SIGNIFICANT CHANGE UP (ref 135–145)
WBC # BLD: 11.17 K/UL — HIGH (ref 3.8–10.5)
WBC # FLD AUTO: 11.17 K/UL — HIGH (ref 3.8–10.5)

## 2024-03-20 PROCEDURE — 84100 ASSAY OF PHOSPHORUS: CPT

## 2024-03-20 PROCEDURE — 86901 BLOOD TYPING SEROLOGIC RH(D): CPT

## 2024-03-20 PROCEDURE — 83735 ASSAY OF MAGNESIUM: CPT

## 2024-03-20 PROCEDURE — 85027 COMPLETE CBC AUTOMATED: CPT

## 2024-03-20 PROCEDURE — 36415 COLL VENOUS BLD VENIPUNCTURE: CPT

## 2024-03-20 PROCEDURE — C1889: CPT

## 2024-03-20 PROCEDURE — 72110 X-RAY EXAM L-2 SPINE 4/>VWS: CPT

## 2024-03-20 PROCEDURE — 80076 HEPATIC FUNCTION PANEL: CPT

## 2024-03-20 PROCEDURE — 86850 RBC ANTIBODY SCREEN: CPT

## 2024-03-20 PROCEDURE — 85610 PROTHROMBIN TIME: CPT

## 2024-03-20 PROCEDURE — C9399: CPT

## 2024-03-20 PROCEDURE — 97167 OT EVAL HIGH COMPLEX 60 MIN: CPT

## 2024-03-20 PROCEDURE — 80048 BASIC METABOLIC PNL TOTAL CA: CPT

## 2024-03-20 PROCEDURE — 99223 1ST HOSP IP/OBS HIGH 75: CPT

## 2024-03-20 PROCEDURE — 86900 BLOOD TYPING SEROLOGIC ABO: CPT

## 2024-03-20 RX ORDER — MAGNESIUM SULFATE 500 MG/ML
1 VIAL (ML) INJECTION ONCE
Refills: 0 | Status: COMPLETED | OUTPATIENT
Start: 2024-03-20 | End: 2024-03-20

## 2024-03-20 RX ORDER — POLYETHYLENE GLYCOL 3350 17 G/17G
17 POWDER, FOR SOLUTION ORAL
Qty: 0 | Refills: 0 | DISCHARGE
Start: 2024-03-20

## 2024-03-20 RX ORDER — METHOCARBAMOL 500 MG/1
1 TABLET, FILM COATED ORAL
Qty: 90 | Refills: 0
Start: 2024-03-20 | End: 2024-04-18

## 2024-03-20 RX ORDER — ACETAMINOPHEN 500 MG
2 TABLET ORAL
Qty: 60 | Refills: 0
Start: 2024-03-20 | End: 2024-03-29

## 2024-03-20 RX ORDER — CELECOXIB 200 MG/1
200 CAPSULE ORAL EVERY 12 HOURS
Refills: 0 | Status: DISCONTINUED | OUTPATIENT
Start: 2024-03-20 | End: 2024-03-20

## 2024-03-20 RX ORDER — CELECOXIB 200 MG/1
1 CAPSULE ORAL
Qty: 60 | Refills: 0
Start: 2024-03-20 | End: 2024-04-18

## 2024-03-20 RX ORDER — METHOCARBAMOL 500 MG/1
1 TABLET, FILM COATED ORAL
Qty: 45 | Refills: 0
Start: 2024-03-20 | End: 2024-04-03

## 2024-03-20 RX ORDER — ACETAMINOPHEN 500 MG
2 TABLET ORAL
Qty: 42 | Refills: 0
Start: 2024-03-20 | End: 2024-03-26

## 2024-03-20 RX ORDER — GABAPENTIN 400 MG/1
2 CAPSULE ORAL
Qty: 0 | Refills: 0 | DISCHARGE
Start: 2024-03-20

## 2024-03-20 RX ORDER — GABAPENTIN 400 MG/1
1 CAPSULE ORAL
Refills: 0 | DISCHARGE

## 2024-03-20 RX ORDER — SENNA PLUS 8.6 MG/1
2 TABLET ORAL
Qty: 0 | Refills: 0 | DISCHARGE
Start: 2024-03-20

## 2024-03-20 RX ADMIN — Medication 975 MILLIGRAM(S): at 13:21

## 2024-03-20 RX ADMIN — POLYETHYLENE GLYCOL 3350 17 GRAM(S): 17 POWDER, FOR SOLUTION ORAL at 13:18

## 2024-03-20 RX ADMIN — CELECOXIB 200 MILLIGRAM(S): 200 CAPSULE ORAL at 06:21

## 2024-03-20 RX ADMIN — Medication 100 GRAM(S): at 09:14

## 2024-03-20 RX ADMIN — Medication 975 MILLIGRAM(S): at 06:29

## 2024-03-20 RX ADMIN — GABAPENTIN 800 MILLIGRAM(S): 400 CAPSULE ORAL at 06:21

## 2024-03-20 RX ADMIN — METHOCARBAMOL 750 MILLIGRAM(S): 500 TABLET, FILM COATED ORAL at 06:21

## 2024-03-20 RX ADMIN — Medication 975 MILLIGRAM(S): at 00:30

## 2024-03-20 RX ADMIN — Medication 975 MILLIGRAM(S): at 06:21

## 2024-03-20 RX ADMIN — BUPRENORPHINE AND NALOXONE 1 FILM(S): 2; .5 TABLET SUBLINGUAL at 07:15

## 2024-03-20 RX ADMIN — FENTANYL CITRATE 30 MILLILITER(S): 50 INJECTION INTRAVENOUS at 07:49

## 2024-03-20 RX ADMIN — CELECOXIB 200 MILLIGRAM(S): 200 CAPSULE ORAL at 06:30

## 2024-03-20 RX ADMIN — GABAPENTIN 800 MILLIGRAM(S): 400 CAPSULE ORAL at 13:17

## 2024-03-20 RX ADMIN — Medication 975 MILLIGRAM(S): at 13:18

## 2024-03-20 RX ADMIN — PANTOPRAZOLE SODIUM 40 MILLIGRAM(S): 20 TABLET, DELAYED RELEASE ORAL at 13:18

## 2024-03-20 NOTE — DISCHARGE NOTE PROVIDER - DISCHARGE DIET
DASH Diet DASH Diet/Consistent Carbohydrate Diabetic Diets Libtayo Pregnancy And Lactation Text: This medication is contraindicated in pregnancy and when breast feeding.

## 2024-03-20 NOTE — CONSULT NOTE ADULT - SUBJECTIVE AND OBJECTIVE BOX
54yF was admitted on 03-19 for elective L5 hemilaminectomy for relief of  severe right foraminal stenosis at L5-S1 that has failed conservative management.     Imaging Reviewed Today:  CXR - No radiographic evidence of active chest disease..  ----------------------------  Patient reports that her "right arm pain is 90% resolved" but continues to have right buttock pain that has decreased.       VITALS  T(C): 36.8 (03-20-24 @ 04:45), Max: 37.3 (03-20-24 @ 00:05)  HR: 90 (03-20-24 @ 04:45) (62 - 101)  BP: 124/80 (03-20-24 @ 04:45) (105/71 - 169/89)  RR: 18 (03-20-24 @ 04:45) (12 - 18)  SpO2: 96% (03-20-24 @ 04:45) (92% - 96%)  Wt(kg): --    PAST MEDICAL & SURGICAL HISTORY  No pertinent past medical history    History of stomach ulcers    Fibroids    ETOHism    GERD (gastroesophageal reflux disease)    Right lumbar radiculopathy    Current smoker    No significant past surgical history    S/P trigger finger release    S/P cholecystectomy    H/O gastric bypass    H/O hernia repair    H/O carpal tunnel repair    S/P appendectomy         RECENT LABS REVIEWED    CBC Full  -  ( 20 Mar 2024 04:51 )  WBC Count : 11.17 K/uL  RBC Count : 4.48 M/uL  Hemoglobin : 12.6 g/dL  Hematocrit : 39.6 %  Platelet Count - Automated : 252 K/uL  Mean Cell Volume : 88.4 fl  Mean Cell Hemoglobin : 28.1 pg  Mean Cell Hemoglobin Concentration : 31.8 gm/dL  Auto Neutrophil # : x  Auto Lymphocyte # : x  Auto Monocyte # : x  Auto Eosinophil # : x  Auto Basophil # : x  Auto Neutrophil % : x  Auto Lymphocyte % : x  Auto Monocyte % : x  Auto Eosinophil % : x  Auto Basophil % : x    03-20    140  |  106  |  8.8  ----------------------------<  104<H>  4.2   |  22.0  |  0.57    Ca    9.2      20 Mar 2024 04:51  Phos  3.6     03-20  Mg     1.7     03-20    TPro  5.6<L>  /  Alb  3.2<L>  /  TBili  0.2<L>  /  DBili  0.1  /  AST  28  /  ALT  17  /  AlkPhos  65  03-19    Urinalysis Basic - ( 20 Mar 2024 04:51 )    Color: x / Appearance: x / SG: x / pH: x  Gluc: 104 mg/dL / Ketone: x  / Bili: x / Urobili: x   Blood: x / Protein: x / Nitrite: x   Leuk Esterase: x / RBC: x / WBC x   Sq Epi: x / Non Sq Epi: x / Bacteria: x        ALLERGIES  No Known Allergies      MEDICATIONS   acetaminophen     Tablet .. 975 milliGRAM(s) Oral every 6 hours  ARIPiprazole 20 milliGRAM(s) Oral at bedtime  buprenorphine 8 mG/naloxone 2 mG SL Film 1 Film(s) SubLingual two times a day  celecoxib 200 milliGRAM(s) Oral every 12 hours  fentaNYL PCA (50 MICROgram(s)/mL) 30 milliLiter(s) PCA Continuous PCA Continuous  gabapentin 800 milliGRAM(s) Oral four times a day  magnesium sulfate  IVPB 1 Gram(s) IV Intermittent once  methocarbamol 750 milliGRAM(s) Oral every 8 hours  naloxone Injectable 0.1 milliGRAM(s) IV Push every 3 minutes PRN  ondansetron Injectable 4 milliGRAM(s) IV Push every 6 hours PRN  pantoprazole  Injectable 40 milliGRAM(s) IV Push daily  pantoprazole  Injectable 40 milliGRAM(s) IV Push daily  polyethylene glycol 3350 17 Gram(s) Oral daily  QUEtiapine 400 milliGRAM(s) Oral at bedtime  senna 2 Tablet(s) Oral at bedtime  sodium chloride 0.9%. 1000 milliLiter(s) IV Continuous <Continuous>      ----------------------------------------------------------------------------------------  FUNCTIONAL HISTORY  Lives with spouse, [address]  Independent with [facility]    FUNCTIONAL STATUS/PROGRESS  3/19 PT  Transfer: Sit to Stand:     · Level of [place]	supervision  · Physical Assist/Nonphysical Assist	supervision  · Weight-Bearing Restrictions	weight-bearing as tolerated    Transfer: Stand to Sit:     · Level of [place]	supervision  · Physical Assist/Nonphysical Assist	supervision  · Weight-Bearing Restrictions	weight-bearing as tolerated    Sit/Stand Transfer Safety Analysis:     · Transfer Safety Concerns Noted	decreased step length  · Impairments Contributing to Impaired Transfers	impaired balance    Gait Skills:     · Level of [place]	contact guard  · Physical Assist/Nonphysical Assist	1 person assist  · Weight-Bearing Restrictions	weight-bearing as tolerated  · Assistive Device	hand-held assist, would benefit from RW  · Gait Distance	150 feet    Gait Analysis:     · Gait Pattern Used	2-point gait  · Gait Deviations Noted	decreased mary; decreased step length; decreased stride length  · Impairments Contributing to Gait Deviations	impaired balance; decreased flexibility; decreased strength    Stair Negotiation:     · Level of [place]	supervision  · Physical Assist/Nonphysical Assist	supervision  · Weight-Bearing Restrictions	weight-bearing as tolerated  · Assistive Device	bilateral rails  · Stair Pattern	step to step  · Number of Stairs	5    3/19 OT  Bathing Training:     · Level of [place]	supervision    Upper Body Dressing Training:     · Level of [place]	supervision    Lower Body Dressing Training:     · Level of [place]	supervision    Toilet Hygiene Training:     · Level of [place]	supervision    Grooming Training:     · Level of [place]	supervision    ----------------------------------------------------------------------------------------  PHYSICAL EXAM  Constitutional - NAD, Comfortable  HEENT - NCAT, EOMI  Neck - Supple, No limited ROM  Chest - Breathing comfortably, No wheezing  Cardiovascular - S1S2   Abdomen - Soft   Extremities - No edema  Neurologic Exam -                    Cognitive - AAO to self, place, date, year, situation     FUNCTIONAL MOTOR EXAM - No focal deficits     Sensory - Intact to LT  Psychiatric - Mood stable, Affect WNL  ----------------------------------------------------------------------------------------  ASSESSMENT/PLAN  54yFemale with functional deficits after elective L5 hemilaminectomy for relief of  severe right foraminal stenosis at L5-S1  Pain - As per pain management  DVT PPX - SCDs  Rehab/Impaired mobility and function - Patient continues to require hospitalization for the above diagnoses and ongoing active management of comorbid complications (PCA pump) that are substantially impairing functional ability and impairing quality of life.     RECOMMEND - OOB daily      When medically optimized, based on the patient's diagnosis, current functional status and potential for progress, expect patient to achieve functional goals for DC HOME.      Will sign off at this time. Thank you for allowing me to be part of your patient's care. Please reconsult PMR for additional rehab recommendations or dispo needs if functional status changes. Discussed the specific management and recommendations above with rehab clinical care team/rehab liaison.      Total Time Spent on Encounter (reviewing clinical notes, labs, radiology, medications, patient history/exam, assessment and plan) - 75 minutes  
Patient is a 54y old  Female who presents with a chief complaint of "im having back surgery" (12 Mar 2024 07:18)      HPI:  52 yo female presents for PST with PMH of of gastric bypass, cholecystectomy, gastric ulcers, GERD, hernia repair, active smoker, ETOH dependance, in AA, last drink 1/2023, on Suboxone, right lumbar radiculopathy. Patient presents with 6-7 months of right leg pain in an L5 distribution that is referable to the severe right foraminal stenosis seen at L5-S1. Pain located in right gluteal with radiation down to right foot. Pain is described as sharp and shooting pain. Pain rated an 8/10 continuous throughout the day. Pain is worse with walking, stairs, standing. Patient has been taking Gabapentin, Suboxone Motrin with no relief. Placing frozen ice bottles against her skin makes the pain a little bit better. Sitting for too long and any movement makes the pain worse. She is also taking Motrin. She denies any numbness tingling. She has not had any recent physical therapy. Pt denies bowel/bladder function changes, dizziness, CP, SOB, fever/chills, nausea/vomiting. Pt scheduled for a Right L5-S1 Decompression scheduled 3/14/24 with Dr. Loyola.      (12 Mar 2024 07:18)            Analgesic Dosing for past 24 hours reviewed as below:  acetaminophen     Tablet ..   975 milliGRAM(s) Oral (03-19-24 @ 06:45)          T(C): 36.9 (03-19-24 @ 06:12), Max: 36.9 (03-19-24 @ 06:12)  HR: 80 (03-19-24 @ 06:12) (80 - 80)  BP: 131/88 (03-19-24 @ 06:12) (131/88 - 131/88)  RR: 16 (03-19-24 @ 06:12) (16 - 16)  SpO2: 97% (03-19-24 @ 06:12) (97% - 97%)        acetaminophen   IVPB .. 1000 milliGRAM(s) IV Intermittent once  fentaNYL    Injectable 25 MICROGram(s) IV Push every 5 minutes PRN  fentaNYL    Injectable 50 MICROGram(s) IV Push every 10 minutes PRN  fentaNYL PCA (50 MICROgram(s)/mL) 30 milliLiter(s) PCA Continuous PCA Continuous  ketorolac   Injectable 30 milliGRAM(s) IV Push once PRN  naloxone Injectable 0.1 milliGRAM(s) IV Push every 3 minutes PRN  ondansetron Injectable 4 milliGRAM(s) IV Push every 6 hours PRN  ondansetron Injectable 4 milliGRAM(s) IV Push once PRN            PT/INR - ( 19 Mar 2024 06:40 )   PT: 9.4 sec;   INR: 0.84 ratio               Pain Service   [phone]

## 2024-03-20 NOTE — DISCHARGE NOTE NURSING/CASE MANAGEMENT/SOCIAL WORK - PATIENT PORTAL LINK FT
You can access the FollowMyHealth Patient Portal offered by Health system by registering at the following website: [email or web address]. By joining [employer]’s FollowMyHealth portal, you will also be able to view your health information using other applications (apps) compatible with our system.

## 2024-03-20 NOTE — DISCHARGE NOTE PROVIDER - NSDCFUADDINST_GEN_ALL_CORE_FT
Please make an appointment for follow up with neurosurgeon Dr. Harper Ventura's office in 1-2 weeks for wound check. Call [phone] to schedule an appointment. Sutures/staples will be removed at follow up appointment. Keep incision site clean and dry. No creams, lotions, or ointments to incision area. Ok to shower but do not allow water to hit incision site directly. Gently pat dry after shower.    NO heavy lifting, strenuous activity, twisting, bending, driving, or working until cleared by your physician.  Return to ER immediately for any of the following: fever, bleeding, new onset numbness/tingling/weakness, nausea and/or vomiting, chest pain, shortness of breath, confusion, seizure, altered mental status, urinary and/or fecal incontinence or retention.

## 2024-03-20 NOTE — DISCHARGE NOTE PROVIDER - NSDCMRMEDTOKEN_GEN_ALL_CORE_FT
Abilify 20 mg oral tablet: 1 tab(s) orally once a day (at bedtime)  gabapentin 800 mg oral tablet: 1 tab(s) orally 4 times a day  polyethylene glycol 3350 oral powder for reconstitution: 17 gram(s) orally once a day  senna leaf extract oral tablet: 2 tab(s) orally once a day (at bedtime)  Seroquel 400 mg oral tablet: 1 tab(s) orally once a day (at bedtime)  Suboxone 8 mg-2 mg sublingual film: 2 film(s) sublingually

## 2024-03-20 NOTE — PROGRESS NOTE ADULT - ASSESSMENT
POD 1 augusta zambrano for L5-s1  Pmhx: Gastric bypass hx, cholecystectomy, ETOH hx, GERD , hernia repair, gastric ulcer      Plan:  -pt will continue suboxone as per the pain service 3x /d  -pt will be continued on the gabapentin.   -patient will need to avoid lifting, bending and prolong sitting  -pt was provided incisional care instructions.   -pt will follow up in 2 weeks with Dr Dumont   POD 1 augusta zambrano for L5-s1  Pmhx: Gastric bypass hx, cholecystectomy, ETOH hx, GERD , hernia repair, gastric ulcer      Plan:  -pt will continue suboxone as per the pain service 3x /d  -pt will be continued on the gabapentin.   -patient will need to avoid lifting, bending and prolong sitting  -pt was provided incisional care instructions.   -prescriptions for robaxin, celebrex and tylenol sent to [facility]  -fruits and fibers recomm to maintain healthy bowel health  -pt will follow up in 2 weeks with Dr Dumont   POD 1 augusta zambrano for L5-s1  Pmhx: Gastric bypass hx, cholecystectomy, ETOH hx, GERD , hernia repair, gastric ulcer  bm x2( 3/20)      Plan:  -pt will continue suboxone as per the pain service 3x /d  -pt will be continued on the gabapentin.   -patient will need to avoid lifting, bending and prolong sitting  -pt was provided incisional care instructions.   -prescriptions for robaxin, celebrex and tylenol sent to [facility]  -fruits and fibers recomm to maintain healthy bowel health  -pt will follow up in 2 weeks with Dr Dumont    On ___3/20_, the patient  view of the hospital course including admission date, diagnosis, surgery / procedure, complications if applicable, discharge instructions including but not limited to: incision care, signs/symptoms to look out for after discharge, home medications, new medications, patient / family education/training and instructions for follow up. Any concerns were addressed, and all questions were answered.

## 2024-03-20 NOTE — PROGRESS NOTE ADULT - ASSESSMENT
54 yo female presents for PST with PMH of of gastric bypass, cholecystectomy, gastric ulcers, GERD, hernia repair, active smoker, ETOH dependance, in AA, last drink 1/2023,   hx of ivda - heroin overdose 2017 now on Suboxone 8mg TID  right lumbar radiculopathy s/p Hemilaminectomy, spine, lumbar 19-Mar-2024    doing better today  fpca w minimal use - 40mcg/4hr        plan:  DC PCA  change suboxone to home dose of 8mg tid    can be dc'd on non-opioids as ordered  pt to restart her home suboxone

## 2024-03-20 NOTE — DISCHARGE NOTE PROVIDER - HOSPITAL COURSE
54 yo female presents for PST with PMH of of gastric bypass, cholecystectomy, gastric ulcers, GERD, hernia repair, active smoker, ETOH dependance, in AA, last drink 1/2023, on Suboxone, right lumbar radiculopathy. Patient presents with 6-7 months of right leg pain in an L5 distribution that is referable to the severe right foraminal stenosis seen at L5-S1. Pain located in right gluteal with radiation down to right foot. Pain is described as sharp and shooting pain. Pain rated an 8/10 continuous throughout the day. Pain is worse with walking, stairs, standing. Patient has been taking Gabapentin, Suboxone Motrin with no relief. Placing frozen ice bottles against her skin makes the pain a little bit better. Sitting for too long and any movement makes the pain worse. She is also taking Motrin. She denies any numbness tingling. She has not had any recent physical therapy. Pt denies bowel/bladder function changes, dizziness, CP, SOB, fever/chills, nausea/vomiting. Pt scheduled for a Right L5-S1 Decompression scheduled 3/14/24 with Dr. Loyola.    (12 Mar 2024 07:18)  Pt seen post op states that the right leg pain is almost completely resolved.  PT has small amount of tenderness or into the right buttock. Pt has been tolerating po intake. Pt has been evaluated by the physical therapy team and she has been cleared. Initially post op the pt was evaluated by the pain service for pain meds. Pt will continue to follow with the pain service for medication follow up.

## 2024-03-20 NOTE — DISCHARGE NOTE PROVIDER - CARE PROVIDER_API CALL
Harper Ventura  Neurosurgery  [address] [place] [id number]  Phone: [phone]  Fax: [phone]  Follow Up Time: 1 month

## 2024-03-20 NOTE — DISCHARGE NOTE NURSING/CASE MANAGEMENT/SOCIAL WORK - NSDCPEFALRISK_GEN_ALL_CORE
For information on Fall & Injury Prevention, visit: [email or web address] OR  [email or web address] OR  https://www.cdc.gov/steadi/patient.html

## 2024-04-02 PROBLEM — F17.200 NICOTINE DEPENDENCE, UNSPECIFIED, UNCOMPLICATED: Chronic | Status: ACTIVE | Noted: 2024-03-12

## 2024-04-02 PROBLEM — K21.9 GASTRO-ESOPHAGEAL REFLUX DISEASE WITHOUT ESOPHAGITIS: Chronic | Status: ACTIVE | Noted: 2024-03-12

## 2024-04-02 PROBLEM — F10.20 ALCOHOL DEPENDENCE, UNCOMPLICATED: Chronic | Status: ACTIVE | Noted: 2024-03-12

## 2024-05-09 ENCOUNTER — APPOINTMENT (OUTPATIENT)
Dept: NEUROSURGERY | Facility: CLINIC | Age: 54
End: 2024-05-09

## 2024-06-06 ENCOUNTER — NON-APPOINTMENT (OUTPATIENT)
Age: 54
End: 2024-06-06

## 2024-06-07 ENCOUNTER — APPOINTMENT (OUTPATIENT)
Dept: NEUROSURGERY | Facility: CLINIC | Age: 54
End: 2024-06-07
Payer: MEDICAID

## 2024-06-07 VITALS
SYSTOLIC BLOOD PRESSURE: 126 MMHG | OXYGEN SATURATION: 95 % | BODY MASS INDEX: 20.66 KG/M2 | HEIGHT: 65 IN | WEIGHT: 124 LBS | TEMPERATURE: 98.8 F | DIASTOLIC BLOOD PRESSURE: 80 MMHG | HEART RATE: 76 BPM

## 2024-06-07 DIAGNOSIS — M48.061 SPINAL STENOSIS, LUMBAR REGION WITHOUT NEUROGENIC CLAUDICATION: ICD-10-CM

## 2024-06-07 DIAGNOSIS — M54.16 RADICULOPATHY, LUMBAR REGION: ICD-10-CM

## 2024-06-07 PROCEDURE — 99024 POSTOP FOLLOW-UP VISIT: CPT

## 2024-06-07 RX ORDER — CELECOXIB 200 MG/1
200 CAPSULE ORAL DAILY
Qty: 30 | Refills: 0 | Status: ACTIVE | COMMUNITY
Start: 2024-06-07 | End: 1900-01-01

## 2024-06-07 RX ORDER — ACETAMINOPHEN EXTRA STRENGTH 500 MG/1
500 TABLET ORAL
Qty: 90 | Refills: 0 | Status: ACTIVE | COMMUNITY
Start: 2024-06-07 | End: 1900-01-01

## 2024-06-07 RX ORDER — IBUPROFEN 800 MG/1
800 TABLET, FILM COATED ORAL 3 TIMES DAILY
Qty: 90 | Refills: 0 | Status: ACTIVE | COMMUNITY
Start: 2024-06-07 | End: 1900-01-01

## 2024-06-07 RX ORDER — METHYLPREDNISOLONE 4 MG/1
4 TABLET ORAL
Qty: 1 | Refills: 0 | Status: ACTIVE | COMMUNITY
Start: 2024-06-07 | End: 1900-01-01

## 2024-06-07 NOTE — ASSESSMENT
[FreeTextEntry1] : Ms. Enma Can is a very pleasant 54 year old female with a history of gastric bypass, cholecystectomy, gastric ulcers, GERD, hernia repair, active smoker on suboxone, recent right L5-S1 decompression on 3/19/2024 with Dr. Ventura who presents for follow-up.  She has had return of her symptoms and so I would like her to undergo an MRI of her lumbar spine with and without contrast to assess if she has a new disc herniation or structural cause for her symptoms.  She should follow-up with pain management to schedule an injection.  I have also renewed some of her non-narcotic medications and ordered a Medrol Dosepak.  I will see her back in 6 weeks. All questions answered.  She knows to call my office with any issues or concerns.

## 2024-06-07 NOTE — HISTORY OF PRESENT ILLNESS
[FreeTextEntry1] : Ms. Enma Can is a very pleasant 54 year old female with a history of gastric bypass, cholecystectomy, gastric ulcers, GERD, hernia repair, active smoker on suboxone, recent right L5-S1 decompression on 3/19/2024 with Dr. Ventura who presents for follow-up.  She reports that she initially did very well with complete resolution of her pain but 2 to 3 weeks ago her pain started returning.  Her pain is currently rated 4 out of 10 but can get as severe as 10 out of 10.  It is worse with sitting and laying down.  It is better during the day.  She has severe pain at least once every day.  No issues with her incision.

## 2024-12-30 NOTE — PATIENT PROFILE ADULT - HOW PATIENT ADDRESSED, PROFILE
This RN went into Pt room to help Pt eat breakfast. Pt ate few bites of oatmeal and applesauce. Pt states she needs some time before she can eat any more food.      0955  Pt ate few more bites of applesauce. States she does not want to eat anymore this morning.   
brunilda
(3) no apparent problem

## 2025-03-18 ENCOUNTER — NON-APPOINTMENT (OUTPATIENT)
Age: 55
End: 2025-03-18

## (undated) DEVICE — ELCTR BIPOLAR PROBE

## (undated) DEVICE — DRAPE 3/4 SHEET 52X76"

## (undated) DEVICE — DRAIN JACKSON PRATT 7MM FLAT FULL W 15 FR TROCAR

## (undated) DEVICE — SOL IRR POUR NS 0.9% 1000ML

## (undated) DEVICE — SPONGE SURGICAL STRIP 1/2 X 6"

## (undated) DEVICE — DRAPE SPLIT SHEET 77" X 108"

## (undated) DEVICE — GOWN XXL

## (undated) DEVICE — SUT VICRYL 0 18" CT-1 UNDYED (POP-OFF)

## (undated) DEVICE — ELCTR ROCKER SWITCH PENCIL BLUE 10FT

## (undated) DEVICE — ELCTR SUBDERMAL NDL CLASSIC 1.5M X 59" (6 COLOR)

## (undated) DEVICE — MIDAS REX LEGEND MATCH HEAD FLUTED SM BORE 3.0MM X 10CM

## (undated) DEVICE — TUBING FOR SMOKE EVACUATOR (PURPLE END)

## (undated) DEVICE — ELCTR MONOPOLAR STIMULATOR PROBE FLUSH-TIP

## (undated) DEVICE — DRAPE TOWEL BLUE 17" X 24"

## (undated) DEVICE — ELCTR 4-DISC 20MM 49" (RED, BLUE, GREEN, BLACK)

## (undated) DEVICE — SUT VICRYL 2-0 18" CP-2 UNDYED (POP-OFF)

## (undated) DEVICE — VENODYNE/SCD SLEEVE CALF MEDIUM

## (undated) DEVICE — WARMING BLANKET UPPER ADULT

## (undated) DEVICE — DRSG TELFA 3 X 4

## (undated) DEVICE — MARKING PEN W RULER

## (undated) DEVICE — DRAPE C ARM UNIVERSAL

## (undated) DEVICE — POSITIONER JACKSON TABLE HEADREST 7", CHEST, HIP, THIGH PADS, ARM CRADLE

## (undated) DEVICE — SOL IRR POUR H2O 1000ML

## (undated) DEVICE — DRSG BIOPATCH DISK W CHG 1" W 7.0MM HOLE

## (undated) DEVICE — ELCTR GROUNDING PAD ADULT COVIDIEN

## (undated) DEVICE — ELCTR PEDICLE SCREW PROBE 3MM BALL 1.8MM X 100MM

## (undated) DEVICE — FRAZIER CONNECTING TUBE 2FT 5MM

## (undated) DEVICE — MIDAS REX LEGEND BALL FLUTED SM BORE 5.0MM X 10CM

## (undated) DEVICE — ELCTR BOVIE PENCIL BLADE 10FT

## (undated) DEVICE — MIDAS REX MR8 MATCH HEAD FLUTED SM BORE 3MM X 10CM

## (undated) DEVICE — DRAPE INSTRUMENT POUCH 6.75" X 11"

## (undated) DEVICE — POSITIONER FOAM LAMINECTOMY ARM CRADLE (PINK)

## (undated) DEVICE — POSITIONER FOAM EGG CRATE ULNAR 2PCS (PINK)

## (undated) DEVICE — SUT MONOCRYL 4-0 27" PS-2 UNDYED

## (undated) DEVICE — ELCTR SUBDERMAL NDL 27G X 1/2" WITH TWISTED PAIR

## (undated) DEVICE — DRSG MEPILEX 10 X 10CM (4 X 4") AG

## (undated) DEVICE — FOLEY TRAY 16FR LF URINE METER SURESTEP

## (undated) DEVICE — DRAIN JACKSON PRATT 3 SPRING RESERVOIR W 7FR PVC DRAIN

## (undated) DEVICE — ELCTR SUBDERMAL CORKSCREW NDL 1.2MM

## (undated) DEVICE — SPONGE SURGICAL STRIP 1/4 X 6"

## (undated) DEVICE — GLV 8.5 PROTEXIS (WHITE)

## (undated) DEVICE — PACK NEURO

## (undated) DEVICE — DRAPE XL SHEET 77X98"

## (undated) DEVICE — ELCTR BOVIE TIP BLADE INSULATED 2.75" EDGE